# Patient Record
Sex: FEMALE | Race: WHITE | Employment: OTHER | URBAN - METROPOLITAN AREA
[De-identification: names, ages, dates, MRNs, and addresses within clinical notes are randomized per-mention and may not be internally consistent; named-entity substitution may affect disease eponyms.]

---

## 2017-03-10 ENCOUNTER — TRANSFERRED RECORDS (OUTPATIENT)
Dept: HEALTH INFORMATION MANAGEMENT | Facility: CLINIC | Age: 62
End: 2017-03-10

## 2017-09-26 ENCOUNTER — TRANSFERRED RECORDS (OUTPATIENT)
Dept: HEALTH INFORMATION MANAGEMENT | Facility: CLINIC | Age: 62
End: 2017-09-26

## 2017-11-03 ENCOUNTER — TRANSFERRED RECORDS (OUTPATIENT)
Dept: HEALTH INFORMATION MANAGEMENT | Facility: CLINIC | Age: 62
End: 2017-11-03

## 2018-01-23 ENCOUNTER — TRANSFERRED RECORDS (OUTPATIENT)
Dept: HEALTH INFORMATION MANAGEMENT | Facility: CLINIC | Age: 63
End: 2018-01-23

## 2018-01-23 LAB
CHOLEST SERPL-MCNC: 298 MG/DL (ref 100–199)
CREAT SERPL-MCNC: 0.8 MG/DL (ref 0.57–1.11)
GFR SERPL CREATININE-BSD FRML MDRD: >60 ML/MIN/1.73M2
GLUCOSE SERPL-MCNC: 109 MG/DL (ref 65–100)
HDLC SERPL-MCNC: 71 MG/DL
LDLC SERPL CALC-MCNC: 204 MG/DL
NONHDLC SERPL-MCNC: 227 MG/DL
PAP-ABSTRACT: NORMAL
POTASSIUM SERPL-SCNC: 4.2 MMOL/L (ref 3.5–5)
TRIGL SERPL-MCNC: 114 MG/DL

## 2018-06-15 ENCOUNTER — TRANSFERRED RECORDS (OUTPATIENT)
Dept: HEALTH INFORMATION MANAGEMENT | Facility: CLINIC | Age: 63
End: 2018-06-15

## 2019-02-04 ENCOUNTER — OFFICE VISIT (OUTPATIENT)
Dept: FAMILY MEDICINE | Facility: CLINIC | Age: 64
End: 2019-02-04
Payer: COMMERCIAL

## 2019-02-04 VITALS
HEART RATE: 56 BPM | BODY MASS INDEX: 28.7 KG/M2 | SYSTOLIC BLOOD PRESSURE: 114 MMHG | TEMPERATURE: 98.2 F | OXYGEN SATURATION: 97 % | WEIGHT: 162 LBS | HEIGHT: 63 IN | DIASTOLIC BLOOD PRESSURE: 60 MMHG

## 2019-02-04 DIAGNOSIS — N39.0 RECURRENT UTI: ICD-10-CM

## 2019-02-04 DIAGNOSIS — R23.2 HOT FLASHES: ICD-10-CM

## 2019-02-04 DIAGNOSIS — Z79.890 POSTMENOPAUSAL HORMONE REPLACEMENT THERAPY: ICD-10-CM

## 2019-02-04 DIAGNOSIS — R25.1 TREMOR: Primary | ICD-10-CM

## 2019-02-04 PROCEDURE — 99203 OFFICE O/P NEW LOW 30 MIN: CPT | Performed by: FAMILY MEDICINE

## 2019-02-04 RX ORDER — PROPRANOLOL HYDROCHLORIDE 40 MG/1
40 TABLET ORAL 2 TIMES DAILY
Qty: 90 TABLET | Refills: 1 | Status: SHIPPED | OUTPATIENT
Start: 2019-02-04 | End: 2019-05-01

## 2019-02-04 RX ORDER — CIPROFLOXACIN 250 MG/1
TABLET, FILM COATED ORAL
Refills: 0 | COMMUNITY
Start: 2018-12-04 | End: 2019-02-04

## 2019-02-04 RX ORDER — CIPROFLOXACIN 250 MG/1
TABLET, FILM COATED ORAL
Qty: 20 TABLET | Refills: 0 | Status: SHIPPED | OUTPATIENT
Start: 2019-02-04 | End: 2019-11-02

## 2019-02-04 RX ORDER — CELECOXIB 200 MG/1
200 CAPSULE ORAL
COMMUNITY
Start: 2018-10-25 | End: 2019-04-30 | Stop reason: DRUGHIGH

## 2019-02-04 RX ORDER — PROPRANOLOL HYDROCHLORIDE 40 MG/1
40 TABLET ORAL
COMMUNITY
Start: 2019-02-01 | End: 2019-02-04

## 2019-02-04 SDOH — HEALTH STABILITY: MENTAL HEALTH: HOW OFTEN DO YOU HAVE A DRINK CONTAINING ALCOHOL?: NEVER

## 2019-02-04 ASSESSMENT — MIFFLIN-ST. JEOR: SCORE: 1258.96

## 2019-02-04 NOTE — PROGRESS NOTES
SUBJECTIVE:   Tip Deng is a 63 year old female who presents to clinic today for the following health issues:      New Patient/Transfer of Care.  She now has new insurance, so here to establish care  Medication Followup/Refill request Propranolol, Cipro, Celebrex and Estradiol cream     Taking Medication as prescribed: yes    Side Effects:  None    Medication Helping Symptoms:  yes       PROBLEMS TO ADD ON...  1. She has known history of tremor and has been on Inderal for a few years . doing quite well and wish to continue.    2. She has history of recurrent UTI's especially after intercourse, so she has been given Cipro to take as needed right after the intercourse and that works well for her.  She is sexually active although her partner is out of town, so not needing it very frequently.  No symptoms at this time.  She likes to have prescription handy just in case when she needs it.    3. She also has been on HRT for last 3-years.  Although she has been menopausal for many years and always has suffered with hot flashes and it affects her sleep, so she finally decided to take HRT and it really has helped.  She wish to continue.  Although she had her physical just about a year ago and she will be due soon and plans to schedule and return for yearly checkup.  Denies any other new concerning symptoms  today otherwise.     Problem list and histories reviewed & adjusted, as indicated.  Additional history: as documented    There is no problem list on file for this patient.    Past Surgical History:   Procedure Laterality Date      SECTION            HIP SURGERY      2016 , both hip replaced        Social History     Tobacco Use     Smoking status: Former Smoker     Smokeless tobacco: Never Used   Substance Use Topics     Alcohol use: No     Frequency: Never     Family History   Problem Relation Age of Onset     Coronary Artery Disease Mother         mom had bypass at age 80+      Coronary Artery  "Disease Father         on meds     Hyperlipidemia Father      Coronary Artery Disease Other         PGF, had bypass at age 70      Diabetes No family hx of      Cerebrovascular Disease No family hx of      Colon Cancer No family hx of      Breast Cancer No family hx of            Reviewed and updated as needed this visit by clinical staff       Reviewed and updated as needed this visit by Provider         ROS:  Constitutional, HEENT, cardiovascular, pulmonary, GI, , musculoskeletal, neuro, skin, endocrine and psych systems are negative, except as otherwise noted.    OBJECTIVE:     /60   Pulse 56   Temp 98.2  F (36.8  C) (Tympanic)   Ht 1.6 m (5' 3\")   Wt 73.5 kg (162 lb)   SpO2 97%   BMI 28.70 kg/m    Body mass index is 28.7 kg/m .  GENERAL: healthy, alert and no distress  NECK: no adenopathy  RESP: lungs clear to auscultation - no rales, rhonchi or wheezes  CV: regular rate and rhythm, normal S1 S2, no S3 or S4, no murmur, click or rub, no peripheral edema and peripheral pulses strong  PSYCH: mentation appears normal, affect normal/bright        ASSESSMENT/PLAN:         (R25.1) Tremor  (primary encounter diagnosis)  Comment:   Plan: propranolol (INDERAL) 40 MG tablet            (N39.0) Recurrent UTI  Comment: Related to intercourse mostly.  Taking Cipro prophylactically  Plan: ciprofloxacin (CIPRO) 250 MG tablet      (Z79.890) Postmenopausal hormone replacement therapy  Comment:   Plan: ESTRADIOL 0.025/ESTRIOL 0.1 MG/GM CREAM,         progesterone (PROMETRIUM) 200 MG capsule            (R23.2) Hot flashes  Comment:   Plan: ESTRADIOL 0.025/ESTRIOL 0.1 MG/GM CREAM,         progesterone (PROMETRIUM) 200 MG capsule        Has has been on HRT for 3 years, pros/ cons of med's discussed. she wish to continue.  Plans to return for annual physical.           Patient expressed understanding and agreement with treatment plan. All patient's questions were answered, will let me know if has more " later.  Medications: Rx's: Reviewed the potential side effects/complications of medications prescribed.       Kaylene Mccartney MD  [facility] [place]

## 2019-02-04 NOTE — PATIENT INSTRUCTIONS
Take medications as directed.  Cares and treatment  cares discussed   Follow up if problem or concern

## 2019-02-25 ENCOUNTER — OFFICE VISIT (OUTPATIENT)
Dept: FAMILY MEDICINE | Facility: CLINIC | Age: 64
End: 2019-02-25
Payer: COMMERCIAL

## 2019-02-25 VITALS
RESPIRATION RATE: 16 BRPM | OXYGEN SATURATION: 98 % | SYSTOLIC BLOOD PRESSURE: 109 MMHG | TEMPERATURE: 97.5 F | HEART RATE: 54 BPM | DIASTOLIC BLOOD PRESSURE: 71 MMHG

## 2019-02-25 DIAGNOSIS — Z71.84 TRAVEL ADVICE ENCOUNTER: Primary | ICD-10-CM

## 2019-02-25 PROCEDURE — 90691 TYPHOID VACCINE IM: CPT | Performed by: NURSE PRACTITIONER

## 2019-02-25 PROCEDURE — 99402 PREV MED CNSL INDIV APPRX 30: CPT | Mod: 25 | Performed by: NURSE PRACTITIONER

## 2019-02-25 PROCEDURE — 90471 IMMUNIZATION ADMIN: CPT | Performed by: NURSE PRACTITIONER

## 2019-02-25 RX ORDER — ATOVAQUONE AND PROGUANIL HYDROCHLORIDE 250; 100 MG/1; MG/1
1 TABLET, FILM COATED ORAL DAILY
Qty: 20 TABLET | Refills: 0 | Status: SHIPPED | OUTPATIENT
Start: 2019-02-25 | End: 2019-04-17

## 2019-02-25 RX ORDER — AZITHROMYCIN 500 MG/1
500 TABLET, FILM COATED ORAL DAILY
Qty: 3 TABLET | Refills: 0 | Status: SHIPPED | OUTPATIENT
Start: 2019-02-25 | End: 2019-04-17

## 2019-02-25 NOTE — NURSING NOTE
"Chief Complaint   Patient presents with     Travel Clinic     [place]      /71   Pulse 54   Temp 97.5  F (36.4  C) (Oral)   Resp 16   SpO2 98%  Estimated body mass index is 28.7 kg/m  as calculated from the following:    Height as of 2/4/19: 1.6 m (5' 3\").    Weight as of 2/4/19: 73.5 kg (162 lb).  bp completed using cuff size: regular       Health Maintenance addressed:  NONE    n/a    Adela Silva MA     "

## 2019-02-25 NOTE — PROGRESS NOTES
Nurse Note      Itinerary:  [place], [place]       Departure Date: 03/7/2019      Return Date: 03/22/2019      Length of Trip 15 days       Reason for Travel: Tourism           Urban or rural: both      Accommodations: Hotel        IMMUNIZATION HISTORY  Have you received any immunizations within the past 4 weeks?  No  Have you ever fainted from having your blood drawn or from an injection?  No  Have you ever had a fever reaction to vaccination?  No  Have you ever had any bad reaction or side effect from any vaccination?  No  Have you ever had hepatitis A or B vaccine?  Yes  Do you live (or work closely) with anyone who has AIDS, an AIDS-like condition, any other immune disorder or who is on chemotherapy for cancer?  No  Do you have a family history of immunodeficiency?  No  Have you received any injection of immune globulin or any blood products during the past 12 months?  No    Patient roomed by CAROLE Jenkins  Tip Deng is a 63 year old female seen today alone for counsultation for international travel to [facility] for [employer]ism  [employer].  Patient will be departing 10 days and staying for   2 week(s) and  traveling with co-worker(s).      Patient itinerary :  will be in the urban and [place]  region of [facility] which presents risk for Malaria, Dengue Fever, [place] and Zika. exposure.  Itinerary is not known at this time.     Patient's activities will include sightseeing./ business    Patient's country of birth is [place]    Special medical concerns: recurrent UTI  Pre-travel questionnaire was completed by patient and reviewed by provider.     Vitals: /71   Pulse 54   Temp 97.5  F (36.4  C) (Oral)   Resp 16   SpO2 98%   BMI= There is no height or weight on file to calculate BMI.    EXAM:  General:  Well-nourished, well-developed in no acute distress.  Appears to be stated age, interacts appropriately and expresses understanding of information given to patient.    Current  Outpatient Medications   Medication Sig Dispense Refill     celecoxib (CELEBREX) 200 MG capsule Take 200 mg by mouth       ciprofloxacin (CIPRO) 250 MG tablet TAKE 1 T PO AFTER INTERCOURSE AS DIRECTED 20 tablet 0     ESTRADIOL 0.025/ESTRIOL 0.1 MG/GM CREAM Apply 1/4  teaspoonful 1-2 times daily.       progesterone (PROMETRIUM) 200 MG capsule Take 1 capsule (200 mg) by mouth daily 10 capsule 0     propranolol (INDERAL) 40 MG tablet Take 1 tablet (40 mg) by mouth 2 times daily 90 tablet 1     Patient Active Problem List   Diagnosis     Tremor     Recurrent UTI     Postmenopausal hormone replacement therapy     Hot flashes     No Known Allergies      Immunizations discussed include:   Hepatitis A:  Up to date  Hepatitis B: Up to date  Influenza: Up to date  Typhoid: Ordered/given today, risks, benefits and side effects reviewed  Rabies: Declined  Not concerned about risk of disease  reviewed managment of a animal bite or scratch (washing wound, seek medical care within 24 hours for post exposure prophylaxis )  Yellow Fever: Not indicated  Japanese Encephalitis: Not indicated  Meningococcus: Not indicated  Tetanus/Diphtheria: Up to date  Measles/Mumps/Rubella: Immune by disease history per patient report  Cholera: Not needed  Polio: Up to date  Pneumococcal: Under age of 65  Varicella: Immune by disease history per patient report  Zostavax:  Not indicated  Shingrix: deferred  HPV:  Not indicated  TB:  Low risk     Altitude Exposure on this trip: no  Past tolerance to Altitude: na    ASSESSMENT/PLAN:    ICD-10-CM    1. Travel advice encounter Z71.89 atovaquone-proguanil (MALARONE) 250-100 MG tablet     azithromycin (ZITHROMAX) 500 MG tablet     VACCINE ADMINISTRATION, INITIAL     I have reviewed general recommendations for safe travel   including: food/water precautions, insect precautions, safer sex   practices given high prevalence of Zika, HIV and other STDs,   roadway safety. Educational materials and [employer] report  provided.    Malaraia prophylaxis recommended: Malarone (patient will check itinerary for exposure risk )  Symptomatic treatment for traveler's diarrhea: azithromycin  Altitude illness prevention and treatment: none      Evacuation insurance advised and resources were provided to patient.    Total visit time 30 minutes  with over 50% of time spent counseling patient as detailed above.    Mari Amaya CNP

## 2019-02-25 NOTE — PATIENT INSTRUCTIONS
Today February 25, 2019 you received the    Typhoid - injectable. This vaccine is valid for two years.   .    These appointments can be made as a NURSE ONLY visit.    **It is very important for the vaccinations to be given on the scheduled day(s), this helps ensure you receive the full effectiveness of the vaccine.**    Please call [facility] with any questions [phone]    Thank you for visiting [facility]

## 2019-02-25 NOTE — NURSING NOTE
Screening Questionnaire for Adult Immunization    Are you sick today?   No   Do you have allergies to medications, food, a vaccine component or latex?   No   Have you ever had a serious reaction after receiving a vaccination?   No   Do you have a long-term health problem with heart disease, lung disease, asthma, kidney disease, metabolic disease (e.g. diabetes), anemia, or other blood disorder?   No   Do you have cancer, leukemia, HIV/AIDS, or any other immune system problem?   No   In the past 3 months, have you taken medications that affect  your immune system, such as prednisone, other steroids, or anticancer drugs; drugs for the treatment of rheumatoid arthritis, Crohn s disease, or psoriasis; or have you had radiation treatments?   No   Have you had a seizure, or a brain or other nervous system problem?   No   During the past year, have you received a transfusion of blood or blood     products, or been given immune (gamma) globulin or antiviral drug?   No   For women: Are you pregnant or is there a chance you could become        pregnant during the next month?   No   Have you received any vaccinations in the past 4 weeks?   No     Immunization questionnaire answers were all negative.        Per orders of GATO Amaya, injection of Typhoid given by Adela Silva. Patient instructed to remain in clinic for 15 minutes afterwards, and to report any adverse reaction to me immediately.       Screening performed by Adela Silva on 2/25/2019 at 11:23 AM.

## 2019-04-17 ENCOUNTER — ANCILLARY PROCEDURE (OUTPATIENT)
Dept: MAMMOGRAPHY | Facility: CLINIC | Age: 64
End: 2019-04-17
Attending: FAMILY MEDICINE
Payer: COMMERCIAL

## 2019-04-17 ENCOUNTER — OFFICE VISIT (OUTPATIENT)
Dept: FAMILY MEDICINE | Facility: CLINIC | Age: 64
End: 2019-04-17
Payer: COMMERCIAL

## 2019-04-17 VITALS
HEART RATE: 56 BPM | TEMPERATURE: 97.9 F | OXYGEN SATURATION: 98 % | HEIGHT: 63 IN | DIASTOLIC BLOOD PRESSURE: 60 MMHG | WEIGHT: 159 LBS | BODY MASS INDEX: 28.17 KG/M2 | SYSTOLIC BLOOD PRESSURE: 110 MMHG

## 2019-04-17 DIAGNOSIS — Z12.31 VISIT FOR SCREENING MAMMOGRAM: ICD-10-CM

## 2019-04-17 DIAGNOSIS — Z13.9 SCREENING FOR CONDITION: ICD-10-CM

## 2019-04-17 DIAGNOSIS — Z00.00 ROUTINE HISTORY AND PHYSICAL EXAMINATION OF ADULT: Primary | ICD-10-CM

## 2019-04-17 DIAGNOSIS — R23.2 HOT FLASHES: ICD-10-CM

## 2019-04-17 DIAGNOSIS — Z79.890 POSTMENOPAUSAL HORMONE REPLACEMENT THERAPY: ICD-10-CM

## 2019-04-17 DIAGNOSIS — E78.5 HYPERLIPIDEMIA LDL GOAL <130: ICD-10-CM

## 2019-04-17 LAB
ALBUMIN SERPL-MCNC: 3.8 G/DL (ref 3.4–5)
ALP SERPL-CCNC: 53 U/L (ref 40–150)
ALT SERPL W P-5'-P-CCNC: 23 U/L (ref 0–50)
ANION GAP SERPL CALCULATED.3IONS-SCNC: 5 MMOL/L (ref 3–14)
AST SERPL W P-5'-P-CCNC: 20 U/L (ref 0–45)
BILIRUB SERPL-MCNC: 1.4 MG/DL (ref 0.2–1.3)
BUN SERPL-MCNC: 16 MG/DL (ref 7–30)
CALCIUM SERPL-MCNC: 9.3 MG/DL (ref 8.5–10.1)
CHLORIDE SERPL-SCNC: 106 MMOL/L (ref 94–109)
CHOLEST SERPL-MCNC: 323 MG/DL
CO2 SERPL-SCNC: 29 MMOL/L (ref 20–32)
CREAT SERPL-MCNC: 0.76 MG/DL (ref 0.52–1.04)
GFR SERPL CREATININE-BSD FRML MDRD: 83 ML/MIN/{1.73_M2}
GLUCOSE SERPL-MCNC: 112 MG/DL (ref 70–99)
HDLC SERPL-MCNC: 72 MG/DL
LDLC SERPL CALC-MCNC: 219 MG/DL
NONHDLC SERPL-MCNC: 251 MG/DL
POTASSIUM SERPL-SCNC: 4.2 MMOL/L (ref 3.4–5.3)
PROT SERPL-MCNC: 7.2 G/DL (ref 6.8–8.8)
SODIUM SERPL-SCNC: 140 MMOL/L (ref 133–144)
TRIGL SERPL-MCNC: 162 MG/DL

## 2019-04-17 PROCEDURE — 86803 HEPATITIS C AB TEST: CPT | Performed by: FAMILY MEDICINE

## 2019-04-17 PROCEDURE — 99396 PREV VISIT EST AGE 40-64: CPT | Performed by: FAMILY MEDICINE

## 2019-04-17 PROCEDURE — 80053 COMPREHEN METABOLIC PANEL: CPT | Performed by: FAMILY MEDICINE

## 2019-04-17 PROCEDURE — 80061 LIPID PANEL: CPT | Performed by: FAMILY MEDICINE

## 2019-04-17 PROCEDURE — 77067 SCR MAMMO BI INCL CAD: CPT | Mod: TC

## 2019-04-17 PROCEDURE — 36415 COLL VENOUS BLD VENIPUNCTURE: CPT | Performed by: FAMILY MEDICINE

## 2019-04-17 PROCEDURE — 87389 HIV-1 AG W/HIV-1&-2 AB AG IA: CPT | Performed by: FAMILY MEDICINE

## 2019-04-17 ASSESSMENT — MIFFLIN-ST. JEOR: SCORE: 1245.35

## 2019-04-17 NOTE — PROGRESS NOTES
SUBJECTIVE:   CC: Tip Deng is an 63 year old woman who presents for preventive health visit.     Healthy Habits:    Do you get at least three servings of calcium containing foods daily (dairy, green leafy vegetables, etc.)? yes    Amount of exercise or daily activities, outside of work: 6 day(s) per week    Problems taking medications regularly No    Medication side effects: No    Have you had an eye exam in the past two years? yes    Do you see a dentist twice per year? no    Do you have sleep apnea, excessive snoring or daytime drowsiness?no      PROBLEMS TO ADD ON...    Today's PHQ-2 Score:   PHQ-2 (  [employer]) 2019   Q1: Little interest or pleasure in doing things 0   Q2: Feeling down, depressed or hopeless 0   PHQ-2 Score 0       Abuse: Current or Past(Physical, Sexual or Emotional)- No  Do you feel safe in your environment? Yes    Social History     Tobacco Use     Smoking status: Former Smoker     Smokeless tobacco: Never Used   Substance Use Topics     Alcohol use: No     Frequency: Never     If you drink alcohol do you typically have >3 drinks per day or >7 drinks per week? No                     Reviewed orders with patient.  Reviewed health maintenance and updated orders accordingly - Yes  Patient Active Problem List   Diagnosis     Tremor     Recurrent UTI     Postmenopausal hormone replacement therapy     Hot flashes     Hyperlipidemia LDL goal <160     Past Surgical History:   Procedure Laterality Date      SECTION            HIP SURGERY      2016 , both hip replaced        Social History     Tobacco Use     Smoking status: Former Smoker     Smokeless tobacco: Never Used   Substance Use Topics     Alcohol use: No     Frequency: Never     Family History   Problem Relation Age of Onset     Coronary Artery Disease Mother         mom had bypass at age 80+      Coronary Artery Disease Father         on meds     Hyperlipidemia Father      Coronary Artery Disease Other          PGF, had bypass at age 70      Diabetes No family hx of      Cerebrovascular Disease No family hx of      Colon Cancer No family hx of      Breast Cancer No family hx of            Mammogram Screening: Patient over age 50, mutual decision to screen reflected in health maintenance.    Pertinent mammograms are reviewed under the imaging tab.  History of abnormal Pap smear: NO - age 30- 65 PAP every 3 years recommended     Reviewed and updated as needed this visit by clinical staff         Reviewed and updated as needed this visit by Provider        Past Medical History:   Diagnosis Date     Arthritis     knees and hips      Tremor         ROS:  CONSTITUTIONAL: NEGATIVE for fever, chills, change in weight  INTEGUMENTARU/SKIN: NEGATIVE for worrisome rashes, moles or lesions  EYES: NEGATIVE for vision changes or irritation  ENT: NEGATIVE for ear, mouth and throat problems  RESP: NEGATIVE for significant cough or SOB  BREAST: NEGATIVE for masses, tenderness or discharge  CV: NEGATIVE for chest pain, palpitations or peripheral edema  GI: NEGATIVE for nausea, abdominal pain, heartburn, or change in bowel habits  : NEGATIVE for unusual urinary or vaginal symptoms. Periods are regular.  MUSCULOSKELETAL: NEGATIVE for significant arthralgias or myalgia  NEURO: NEGATIVE for weakness, dizziness or paresthesias  ENDOCRINE: NEGATIVE for temperature intolerance, skin/hair changes  HEME/ALLERGY/IMMUNE: NEGATIVE for bleeding problems  PSYCHIATRIC: NEGATIVE for changes in mood or affect    OBJECTIVE:   There were no vitals taken for this visit.  EXAM:  GENERAL APPEARANCE: healthy, alert and no distress  EYES: Eyes grossly normal to inspection, PERRL and conjunctivae and sclerae normal  HENT: ear canals and TM's normal, nose and mouth without ulcers or lesions, oropharynx clear and oral mucous membranes moist  NECK: no adenopathy, no asymmetry, masses, or scars and thyroid normal to palpation  RESP: lungs clear to auscultation - no  rales, rhonchi or wheezes  BREAST: normal without masses, tenderness or nipple discharge and no palpable axillary masses or adenopathy  CV: regular rate and rhythm, normal S1 S2, no S3 or S4, no murmur, click or rub, no peripheral edema and peripheral pulses strong  ABDOMEN: soft, nontender, no hepatosplenomegaly, no masses and bowel sounds normal   (female): deferred  MS: no musculoskeletal defects are noted and gait is age appropriate without ataxia  SKIN: no suspicious lesions or rashes  NEURO: Normal strength and tone, sensory exam grossly normal, mentation intact and speech normal  PSYCH: mentation appears normal and affect normal/bright        ASSESSMENT/PLAN:   (Z00.00) Routine history and physical examination of adult  (primary encounter diagnosis)  Comment:   Plan: Lipid panel reflex to direct LDL Fasting,         Comprehensive metabolic panel            (Z79.890) Postmenopausal hormone replacement therapy  Comment:   Plan:     (R23.2) Hot flashes  Comment: prescribed by previous provider,   Plan: ding well on hrt.  she is trying to go down     (E78.5) Hyperlipidemia LDL goal <130  Comment: has been quiet elevated previously, may consider treatment if remains high   Plan: Lipid panel reflex to direct LDL Fasting,         Comprehensive metabolic panel        Healthy diet and exercise reviewed.    (Z13.9) Screening for condition  Comment:   Plan: HIV Antigen Antibody Combo, Hepatitis C Screen         Reflex to HCV RNA Quant and Genotype            Check labs..Cares and  treatment discussed follow. up if problem   Patient expressed understanding and agreement with treatment plan. All patient's questions were answered, will let me know if has more later.  Medications: Rx's: Reviewed the potential side effects/complications of medications prescribed.     COUNSELING:   Reviewed preventive health counseling, as reflected in patient instructions       Regular exercise       Healthy diet/nutrition       Vision  "screening       Hearing screening       Immunizations    Vaccinated for: Zoster             Osteoporosis Prevention/Bone Health       Consider Hep C screening for patients born between 1945 and 1965       HIV screeninx in teen years, 1x in adult years, and at intervals if high risk    BP Readings from Last 1 Encounters:   19 109/71     Estimated body mass index is 28.7 kg/m  as calculated from the following:    Height as of 19: 1.6 m (5' 3\").    Weight as of 19: 73.5 kg (162 lb).      Weight management plan: Discussed healthy diet and exercise guidelines     reports that she has quit smoking. She has never used smokeless tobacco.      Counseling Resources:  ATP IV Guidelines  Pooled Cohorts Equation Calculator  Breast Cancer Risk Calculator  FRAX Risk Assessment  ICSI Preventive Guidelines  Dietary Guidelines for Americans, 2010  USDA's MyPlate  ASA Prophylaxis  Lung CA Screening    Kaylene Mccartney MD  [facility] [place]  "

## 2019-04-18 LAB
HCV AB SERPL QL IA: NONREACTIVE
HIV 1+2 AB+HIV1 P24 AG SERPL QL IA: NONREACTIVE

## 2019-04-30 ENCOUNTER — OFFICE VISIT (OUTPATIENT)
Dept: FAMILY MEDICINE | Facility: CLINIC | Age: 64
End: 2019-04-30
Payer: COMMERCIAL

## 2019-04-30 VITALS
SYSTOLIC BLOOD PRESSURE: 98 MMHG | HEART RATE: 54 BPM | OXYGEN SATURATION: 99 % | DIASTOLIC BLOOD PRESSURE: 54 MMHG | TEMPERATURE: 98.1 F | BODY MASS INDEX: 28.34 KG/M2 | WEIGHT: 160 LBS

## 2019-04-30 DIAGNOSIS — M25.561 PAIN IN BOTH KNEES, UNSPECIFIED CHRONICITY: ICD-10-CM

## 2019-04-30 DIAGNOSIS — M25.562 PAIN IN BOTH KNEES, UNSPECIFIED CHRONICITY: ICD-10-CM

## 2019-04-30 DIAGNOSIS — E78.5 HYPERLIPIDEMIA LDL GOAL <160: Primary | ICD-10-CM

## 2019-04-30 PROCEDURE — 99213 OFFICE O/P EST LOW 20 MIN: CPT | Performed by: FAMILY MEDICINE

## 2019-04-30 RX ORDER — CELECOXIB 200 MG/1
200 CAPSULE ORAL
Status: CANCELLED | OUTPATIENT
Start: 2019-04-30

## 2019-04-30 RX ORDER — PROPRANOLOL HYDROCHLORIDE 40 MG/1
40 TABLET ORAL 2 TIMES DAILY
Qty: 90 TABLET | Refills: 1 | Status: CANCELLED | OUTPATIENT
Start: 2019-04-30

## 2019-04-30 RX ORDER — SIMVASTATIN 10 MG
10 TABLET ORAL AT BEDTIME
Qty: 30 TABLET | Refills: 3 | Status: SHIPPED | OUTPATIENT
Start: 2019-04-30 | End: 2019-09-04

## 2019-04-30 RX ORDER — CELECOXIB 100 MG/1
100-200 CAPSULE ORAL DAILY PRN
Qty: 60 CAPSULE | Refills: 1 | Status: SHIPPED | OUTPATIENT
Start: 2019-04-30 | End: 2019-07-12

## 2019-04-30 NOTE — PATIENT INSTRUCTIONS
Patient Education     High Cholesterol: Assessing Your Risk    Have you been told that your cholesterol is too high? If so, you could be heading for a heart attack, also known as acute myocardial infarction (AMI), or stroke. This is especially true if you have other risk factors for heart disease. Get smart about cholesterol and your heart disease risk. This sheet can help you understand your heart disease risk and how your cholesterol level affects it. Talk to your healthcare provider about how to get started controlling your cholesterol.  Why is high cholesterol a problem?  Blood cholesterol is a fatty substance. The body uses it to make membranes in cells and for hormone production. It travels through the bloodstream and is used by the tissues for normal function. When blood cholesterol is high, it forms plaque and causes inflammation. The plaque builds up in the walls of arteries (blood vessels that carry blood from the heart to the body). This narrows the opening for blood flow. Over time, the heart may not get enough oxygen. This can lead to coronary artery disease, heart attack, or stroke.  3 steps to assessing your risk  Step 1. Find your risk factors for heart disease and stroke  How your cholesterol numbers affect your heart health depends on other risk factors for heart attack and stroke. Check off each risk factor below that applies to you:    Are you a man 45 years old or older or a woman 55 years old or older?    Does your family have a history of heart problems before the age of 55 in male relatives or age 65 in female relatives? This includes heart attack, coronary heart disease, or atherosclerosis.    Do you have high blood pressure? Do you take medicine to treat high blood pressure?    Do you smoke?    Do you have diabetes?    Do you exercise very little or not very often? Recommendations are for 30 minutes of exercise at least 5 days a week. If you are not doing cardiovascular exercise as often  as these recommendations, it may not be enough and you may be at higher risk for elevated cholesterol and heart disease.    Do you eat a diet that is high in saturated or trans fats, cholesterol, sugar, or alcohol? You may be at increased risk for heart disease if you do not eat enough fruits, vegetables, lean meats and eat sugars or drink alcohol sparingly.    Have you been told you have high cholesterol? Do you take medicine to control your cholesterol?  Step 2. Test your cholesterol  Have your cholesterol tested every 5 years after the age of 20 and more often if you have risk factors. Cholesterol testing most often needs no preparation. Sometimes you may be asked to fast (not eat) before your test. A blood sample is taken and sent to a lab. There, the amount of cholesterol and triglyceride in your blood is measured. There are 2 types of cholesterol in the sample. The first is HDL ( good cholesterol ). The second is LDL ( bad cholesterol ). Cholesterol test results are most often shown as the total of HDL and LDL cholesterol numbers. You may also be told the separate HDL and LDL cholesterol results.  Fill in your numbers below.  HDL cholesterol:                           LDL cholesterol:                         Total cholesterol:                         Triglyceride:                           Step 3. Discuss the results with your healthcare provider   If your cholesterol levels are higher than normal, your healthcare provider will help you with steps to take to lower your levels. Steps may include lifestyle changes like diet, physical activity, and quitting smoking, and medicine to lower bad cholesterol levels.  If you have high cholesterol, you may need your cholesterol level tested more often to make sure your medicine and lifestyle changes are working to reduce your risks of having a heart attack or stroke.   Date Last Reviewed: 6/1/2016 2000-2018 [employer] \[facility]\""  [place] [address]. All rights reserved. This information is not intended as a substitute for professional medical care. Always follow your healthcare professional's instructions.           Patient Education     Understanding Fat and Cholesterol  Too much cholesterol in your blood can lead to many problems such as blocked arteries. This can lead to heart attack and stroke. One of the best ways to manage heart and blood vessel disease is to lower your blood cholesterol. Planning meals that are low in saturated fat and cholesterol helps reduce the level of cholesterol in your blood. Below are eating tips to help lower your blood cholesterol levels.  Eat less fat  A healthy goal is to have less than 25% to 35% of your daily calories come from fat. Instead of fats, eat more fruits, whole-grains, and vegetables. This also helps control your weight, and can even reduce your risk for some cancers. There are different kinds of fats in foods. Fats can be saturated, unsaturated, or trans fats. The best fats to choose are unsaturated fats. But fats are high in calories, so eat even unsaturated fats sparingly.  Limit foods high in saturated fats  Saturated fats come from animals and certain plants (such as coconut and palm). Eating too much saturated fat can raise your blood cholesterol levels and make your artery problems worse. Your goal is to eat less saturated fat. Below are some examples of foods that contain lots of saturated fat:    Fatty cuts of meat (lamb, ham, beef)    Many pastries, cakes, cookies, and candies    Cream, ice cream, sour cream, cheese, and butter, and foods made with them    Sauces made with butter or cream    Salad dressings with saturated fats    Foods that contain palm or coconut oil  Choose unsaturated fats  Unsaturated fats are usually liquid at room temperature. They are better choices for your heart than saturated fat. There are two types of unsaturated fats: polyunsaturated fat and monounsaturated fat.  Aim to replace saturated fats with polyunsaturated or monounsaturated fats.    Polyunsaturated fats are found in corn oil, safflower oil, sunflower oil, and other vegetable oils.    Monounsaturated fats are found in olive oil, canola oil, and peanut oil. Some margarines and spreads are now made with these oils, too. Avocados are also high in monounsaturated fat.  Of all fats, monounsaturated fats are the least harmful to your heart.  Avoid trans fats  Like saturated fats, trans fats have been linked to heart disease. Even a small amount can harm your health. Trans fats are found in liquid oils that have been changed to be solid at room temperature. Margarine, which is often made from vegetable oil, is one example. Vegetable shortening is another. Trans fats are often found in packaged goods. Check ingredients for the words  hydrogenated  or  partially hydrogenated.  They mean the foods contain trans fat.  What about triglycerides?  Triglycerides are a type of fat in your blood. Like cholesterol, high levels of triglycerides can lead to blocked arteries. High triglyceride levels can be reduced 20% to 50%  by limiting added sugars in your diet, susbstituting healthier fats for saturated and trans fats, getting more physical activity, and losing weight if your are overweight. You may also be advised to avoid or limi alcohol.    Reading food labels  Luckily, most foods now have labels giving you the facts about what you re eating. Reading food labels helps you make healthy choices. Look for the words highlighted below.    Serving Size. This is the amount of food in 1 serving. If you eat larger portions, be sure to count more of everything: fat, calories, and cholesterol.    Total Fat. Tells you how many grams (g) of fat are in 1 serving.    Calories from Fat. This tells you the total number of calories from fat in 1 serving (there are 9 calories per gram of fat). Look for foods with the fewest calories from  fat.    Saturated Fat. Tells you how many grams (g) of saturated fat are in 1 serving.    Trans Fat. Tells how many grams (g) of trans fat are in 1 serving.    Cholesterol. Tells you how many milligrams (mg) of cholesterol are in 1 serving.  Date Last Reviewed: 6/1/2017 2000-2018 [employer]. [address] [place] [address]. All rights reserved. This information is not intended as a substitute for professional medical care. Always follow your healthcare professional's instructions.           Patient Education     Understanding Food and Cholesterol  Having a high cholesterol level puts you at risk for heart disease and other health problems. What you eat has a big effect on your body s cholesterol level. Eating certain foods can raise your cholesterol. Other foods can help you lower it. Watching what you eat can help you get your cholesterol level under control.  Know which foods are high in saturated fat and trans fat  Foods high in saturated fat and trans fat can raise your LDL (bad) cholesterol. It s important to know which foods are high in these fats, and eat less of them. This can help you manage your cholesterol levels.  Foods high in these fats are:    Animal products, including beef, lamb, pork, and poultry with skin on    Cold cuts, arcos, sausage    Creamy sauces and fatty gravies    Cookies, donuts, muffins, and pastries    Fried foods    Shortening, butter, coconut oil, palm oil, cocoa butter, partially hydrogenated oils (read labels)    High-fat dairy products, such as whole milk, cheese, cream cheese, and ice cream  Better choices:    Lean beef, skinless white-meat poultry, fish    Tomato sauce, vegetable puree    Dried fruit, bagels, bread with jam    Baked, broiled, steamed, or roasted foods    Soft (tub) margarine, canola oil, and olive oil in moderate amounts    Low-fat or nonfat dairy products, such as 1% or fat-free milk, and reduced-fat cheese  Use fiber to help  control cholesterol  Foods high in fiber can help you keep your cholesterol down. Good sources of fiber are:    Oats    Barley    Whole grains    Beans    Vegetables    Cornmeal    Popcorn    Berries, apples, other fruits  Date Last Reviewed: 10/1/2017    3671-4532 [employer]. [address] [place] [address]. All rights reserved. This information is not intended as a substitute for professional medical care. Always follow your healthcare professional's instructions.

## 2019-04-30 NOTE — PROGRESS NOTES
SUBJECTIVE:   Tip Deng is a 64 year old female who presents to clinic today for the following   health issues:      Concern - Pt is here to f/u for labs drawn on 19      Hyperlipidemia Follow-Up      Rate your low fat/cholesterol diet?: good    Taking statin?  No, although willing to start since her lipid has been elevated for a while and current labs are also very high . Has FAM hx of heart problem in mom and grand parent in old age. Also has hyperlipidemia in family members including dad and treated with med's     Other lipid medications/supplements?:  none    PROBLEMS TO ADD ON...  Joint or Musculoskeletal Pain  Duration of complaint:    Description: has ongoing problem for a long time likely osteoarthritis , as she has hip replaced previously , and also  her knees have ongoing pain, takes Celebrex almost daily at times to help,   So need refill . Has been prescribed by previous provider and taking it for a while   Location: both knee   Character: Dull ache affects her activity level   Intensity: mild, moderate  Progression of Symptoms: same, but lately needing pain med's almost daily   Accompanying Signs & Symptoms: Other symptoms: no radiation of pain , numbness, swelling and redness  History: Previous similar pain: YES  , was seeing ortho in [place] previously but none for few years  Precipitating factors: Trauma or overuse: YES, was active in sports at younger age   Alleviating factors: Improved by: Celebrex   Therapies Tried and outcome: it helps but needs it almost daily       Additional history: as documented    Reviewed  and updated as needed this visit by clinical staff         Reviewed and updated as needed this visit by Provider         Patient Active Problem List   Diagnosis     Tremor     Recurrent UTI     Postmenopausal hormone replacement therapy     Hot flashes     Hyperlipidemia LDL goal <160     Past Surgical History:   Procedure Laterality Date      SECTION             HIP SURGERY      2016 , both hip replaced        Social History     Tobacco Use     Smoking status: Former Smoker     Smokeless tobacco: Never Used   Substance Use Topics     Alcohol use: No     Frequency: Never     Family History   Problem Relation Age of Onset     Coronary Artery Disease Mother         mom had bypass at age 80+      Coronary Artery Disease Father         on meds     Hyperlipidemia Father      Coronary Artery Disease Other         PGF, had bypass at age 70      Diabetes No family hx of      Cerebrovascular Disease No family hx of      Colon Cancer No family hx of      Breast Cancer No family hx of            ROS:  Constitutional, HEENT, cardiovascular, pulmonary, GI, , musculoskeletal, neuro, skin, endocrine and psych systems are negative, except as otherwise noted.    OBJECTIVE:     BP 98/54 (BP Location: Left arm, Patient Position: Chair, Cuff Size: Adult Regular)   Pulse 54   Temp 98.1  F (36.7  C) (Tympanic)   Wt 72.6 kg (160 lb)   SpO2 99%   BMI 28.34 kg/m    Body mass index is 28.34 kg/m .  GENERAL: healthy, alert and no distress  NECK: no adenopathy, no asymmetry, masses, or scars and thyroid normal to palpation  RESP: lungs clear to auscultation - no rales, rhonchi or wheezes  CV: regular rate and rhythm, normal S1 S2, no S3 or S4, no murmur, click or rub, no peripheral edema and peripheral pulses strong  ABDOMEN: soft, nontender, no hepatosplenomegaly, no masses and bowel sounds normal  MS:  Both knees with normal range of motion and no acute tenderness to palpation today         ASSESSMENT/PLAN:         (E78.5) Hyperlipidemia LDL goal <160  (primary encounter diagnosis)  Comment:   Plan: simvastatin (ZOCOR) 10 MG tablet        Lipid remains quiet elevated over last few years , and LDL (bad cholesterol ) quiet high  and remain above goal. Also ahs other risk factors including FAM hx of cad in ole age, she is postmenopausal etc.  so discussed options of   starting treatment and she  is willing to start statin. Pros/ cons of med's discussed. She will start low dose Zocor. Pros/ cons f med's discussed   In the meantime need to continue watch diet( low fat, low cholesterol, high fiber diet) exercise. May also take omega-3 fatty acid( fish oil or flex seed oil capsule OTC) to help improve lipid  Cares and  treatment discussed.  follow up check 10- 12 weeks , sooner  if problem       (M25.561,  M25.562) Pain in both knees, unspecified chronicity  Comment: has ongoing problem for a long time likely osteoarthritis , as she has hip replaced previously   Plan: ORTHOPEDICS ADULT REFERRAL, celecoxib         (CELEBREX) 100 MG capsule        Pros/ cons f med's discussed. Refill given although encouraged alternating with OTC tylenol as needed. Also gave referral to ortho to further evaluate bc of her ongoing sx.         Patient expressed understanding and agreement with treatment plan. All patient's questions were answered, will let me know if has more later.  Medications: Rx's: Reviewed the potential side effects/complications of medications prescribed.       Kaylene Mccartney MD  [facility]

## 2019-05-01 DIAGNOSIS — R25.1 TREMOR: ICD-10-CM

## 2019-05-02 RX ORDER — PROPRANOLOL HYDROCHLORIDE 40 MG/1
TABLET ORAL
Qty: 180 TABLET | Refills: 0 | Status: SHIPPED | OUTPATIENT
Start: 2019-05-02 | End: 2019-07-12

## 2019-05-02 NOTE — TELEPHONE ENCOUNTER
Prescription approved per [facility] Refill Protocol.  Quantity updated to reflect twice a day dosing.  Maria Antonia Vasques RN

## 2019-05-02 NOTE — TELEPHONE ENCOUNTER
"Requested Prescriptions   Pending Prescriptions Disp Refills     propranolol (INDERAL) 40 MG tablet [Pharmacy Med Name: PROPRANOLOL 40MG TABLETS] 180 tablet 0     Sig: TAKE 1 TABLET BY MOUTH TWICE DAILY       Beta-Blockers Protocol Passed - 5/1/2019  7:25 PM        Passed - Blood pressure under 140/90 in past 12 months     BP Readings from Last 3 Encounters:   04/30/19 98/54   04/17/19 110/60   02/25/19 109/71                 Passed - Patient is age 6 or older        Passed - Recent (12 mo) or future (30 days) visit within the authorizing provider's specialty     Patient had office visit in the last 12 months or has a visit in the next 30 days with authorizing provider or within the authorizing provider's specialty.  See \"Patient Info\" tab in inbasket, or \"Choose Columns\" in Meds & Orders section of the refill encounter.              Passed - Medication is active on med list      Last Written Prescription Date:  2/4/2019  Last Fill Quantity: 90,  # refills: 1   Last office visit: 4/30/2019 with prescribing provider:  4/30/2019   Future Office Visit:      "

## 2019-05-27 DIAGNOSIS — R25.1 TREMOR: ICD-10-CM

## 2019-05-28 RX ORDER — PROPRANOLOL HYDROCHLORIDE 40 MG/1
TABLET ORAL
Qty: 180 TABLET | Refills: 0 | OUTPATIENT
Start: 2019-05-28

## 2019-05-28 NOTE — TELEPHONE ENCOUNTER
"Requested Prescriptions   Pending Prescriptions Disp Refills     propranolol (INDERAL) 40 MG tablet [Pharmacy Med Name: PROPRANOLOL 40MG TABLETS] 180 tablet 0     Sig: TAKE 1 TABLET BY MOUTH TWICE DAILY       Beta-Blockers Protocol Passed - 5/27/2019  2:32 PM        Passed - Blood pressure under 140/90 in past 12 months     BP Readings from Last 3 Encounters:   04/30/19 98/54   04/17/19 110/60   02/25/19 109/71                 Passed - Patient is age 6 or older        Passed - Recent (12 mo) or future (30 days) visit within the authorizing provider's specialty     Patient had office visit in the last 12 months or has a visit in the next 30 days with authorizing provider or within the authorizing provider's specialty.  See \"Patient Info\" tab in inbasket, or \"Choose Columns\" in Meds & Orders section of the refill encounter.              Passed - Medication is active on med list        propranolol (INDERAL) 40 MG tablet 180 tablet 0 5/2/2019       Last Written Prescription Date:  05/02/2019  Last Fill Quantity: 180,  # refills: 0   Last office visit: 4/30/2019 with prescribing provider:  Dr. Mccartney   Future Office Visit:  Unknown     "

## 2019-07-12 DIAGNOSIS — M25.562 PAIN IN BOTH KNEES, UNSPECIFIED CHRONICITY: ICD-10-CM

## 2019-07-12 DIAGNOSIS — R25.1 TREMOR: ICD-10-CM

## 2019-07-12 DIAGNOSIS — M25.561 PAIN IN BOTH KNEES, UNSPECIFIED CHRONICITY: ICD-10-CM

## 2019-07-12 RX ORDER — PROPRANOLOL HYDROCHLORIDE 40 MG/1
TABLET ORAL
Qty: 180 TABLET | Refills: 0 | Status: SHIPPED | OUTPATIENT
Start: 2019-07-12 | End: 2019-11-24

## 2019-07-12 RX ORDER — CELECOXIB 100 MG/1
CAPSULE ORAL
Qty: 60 CAPSULE | Refills: 0 | Status: SHIPPED | OUTPATIENT
Start: 2019-07-12 | End: 2019-11-02

## 2019-07-12 NOTE — TELEPHONE ENCOUNTER
"Requested Prescriptions   Pending Prescriptions Disp Refills     celecoxib (CELEBREX) 100 MG capsule [Pharmacy Med Name: CELECOXIB 100MG CAPSULES] 60 capsule 0     Sig: TAKE 1 TO 2 CAPSULES(100  MG) BY MOUTH DAILY AS NEEDED FOR MODERATE PAIN   Last Written Prescription Date:  4/30/2019  Last Fill Quantity: 60,  # refills: 1   Last office visit: 4/30/2019 with prescribing provider:  Bib   Future Office Visit:   Next 5 appointments (look out 90 days)    Jul 31, 2019 10:00 AM CDT  Office Visit with Kaylene Mccartney MD  [facility] ([address] [address] [phone]             NSAID Medications Failed - 7/12/2019  3:45 PM        Failed - Normal CBC on file in past 12 months     No lab results found.              Passed - Blood pressure under 140/90 in past 12 months     BP Readings from Last 3 Encounters:   04/30/19 98/54   04/17/19 110/60   02/25/19 109/71                 Passed - Normal ALT on file in past 12 months     Recent Labs   Lab Test 04/17/19  1023   ALT 23             Passed - Normal AST on file in past 12 months     Recent Labs   Lab Test 04/17/19  1023   AST 20             Passed - Recent (12 mo) or future (30 days) visit within the authorizing provider's specialty     Patient had office visit in the last 12 months or has a visit in the next 30 days with authorizing provider or within the authorizing provider's specialty.  See \"Patient Info\" tab in inbasket, or \"Choose Columns\" in Meds & Orders section of the refill encounter.              Passed - Patient is age 6-64 years        Passed - Medication is active on med list        Passed - No active pregnancy on record        Passed - Normal serum creatinine on file in past 12 months     Recent Labs   Lab Test 04/17/19  1023   CR 0.76             Passed - No positive pregnancy test in past 12 months        propranolol (INDERAL) 40 MG tablet [Pharmacy Med Name: " "PROPRANOLOL 40MG TABLETS] 180 tablet 0     Sig: TAKE 1 TABLET BY MOUTH TWICE DAILY   Last Written Prescription Date:  5/2/2019  Last Fill Quantity: 180,  # refills: 0   Last office visit: 4/30/2019 with prescribing provider:  Bbi   Future Office Visit:   Next 5 appointments (look out 90 days)    Jul 31, 2019 10:00 AM CDT  Office Visit with Kaylene Mccartney MD  [facility] ([address] [id number] [phone]             Beta-Blockers Protocol Passed - 7/12/2019  3:45 PM        Passed - Blood pressure under 140/90 in past 12 months     BP Readings from Last 3 Encounters:   04/30/19 98/54   04/17/19 110/60   02/25/19 109/71                 Passed - Patient is age 6 or older        Passed - Recent (12 mo) or future (30 days) visit within the authorizing provider's specialty     Patient had office visit in the last 12 months or has a visit in the next 30 days with authorizing provider or within the authorizing provider's specialty.  See \"Patient Info\" tab in inbasket, or \"Choose Columns\" in Meds & Orders section of the refill encounter.              Passed - Medication is active on med list        Patient went on a business trip today and forgot medications at home.  Patient is currently standing at pharmacy in [place] waiting for Rx's.  Will send a 30 day supply of celebrex as patient has an appointment scheduled for 7/31/2019 with PCP.    DAVIS EdenN, RN  [employer] [facility]    "

## 2019-07-31 ENCOUNTER — OFFICE VISIT (OUTPATIENT)
Dept: FAMILY MEDICINE | Facility: CLINIC | Age: 64
End: 2019-07-31
Payer: COMMERCIAL

## 2019-07-31 VITALS
TEMPERATURE: 98.2 F | DIASTOLIC BLOOD PRESSURE: 70 MMHG | OXYGEN SATURATION: 100 % | BODY MASS INDEX: 29.62 KG/M2 | SYSTOLIC BLOOD PRESSURE: 110 MMHG | HEART RATE: 56 BPM | WEIGHT: 167.2 LBS | HEIGHT: 63 IN

## 2019-07-31 DIAGNOSIS — M25.559 HIP PAIN, ACUTE, UNSPECIFIED LATERALITY: Primary | ICD-10-CM

## 2019-07-31 DIAGNOSIS — E78.5 HYPERLIPIDEMIA LDL GOAL <160: ICD-10-CM

## 2019-07-31 PROCEDURE — 99214 OFFICE O/P EST MOD 30 MIN: CPT | Performed by: FAMILY MEDICINE

## 2019-07-31 ASSESSMENT — MIFFLIN-ST. JEOR: SCORE: 1277.54

## 2019-07-31 NOTE — PROGRESS NOTES
".Subjective     Tip Deng is a 64 year old female who presents to clinic today for the following health issues:    HPI     Medication Followup of Celebrex/ Zocor     Taking Medication as prescribed: cut dose in half     Side Effects:  None    Medication Helping Symptoms:  NO-needs to increase dose            Hyperlipidemia Follow-Up      Are you having any of the following symptoms? (Select all that apply)  No complaints of shortness of breath, chest pain or pressure.  No increased sweating or nausea with activity.  No left-sided neck or arm pain.  No complaints of pain in calves when walking 1-2 blocks.    Are you regularly taking any medication or supplement to lower your cholesterol?   No    Are you having muscle aches or other side effects that you think could be caused by your cholesterol lowering medication?  No        Amount of exercise or physical activity: 4-5 days/week for an average of 30-45 minutes    Problems taking medications regularly: No    Medication side effects: none    Diet: low fat/cholesterol          Having bilateral hip pain x 2 months ago - pain is intermittent - sometimes sharp sometimes deep ache.    She thinks  since she has cut down on her celebrex,  her aches and pain may have been  worse, not sure if it is related to statin bc she did started  all the same time.  She feels so body sort of aches, but mostly her knees and hip that hurt the most ,and feels very stiff sometimes.  She denies any fevers or night sweats.   She is s/p hip replacement man years ago. Her knees also have bothered her, \"they always hurt\"  but lately her hip  Has agaravated reecntly. . She is planning to go back to [place] to see her Ortho but just concerned there could be something else going on to explain her aches and pains and stiffness.       Patient Active Problem List   Diagnosis     Tremor     Recurrent UTI     Postmenopausal hormone replacement therapy     Hot flashes     Hyperlipidemia LDL goal " <160     Pain in both knees, unspecified chronicity     Past Surgical History:   Procedure Laterality Date      SECTION            HIP SURGERY      2016 , both hip replaced        Social History     Tobacco Use     Smoking status: Former Smoker     Smokeless tobacco: Never Used   Substance Use Topics     Alcohol use: No     Frequency: Never     Family History   Problem Relation Age of Onset     Coronary Artery Disease Mother         mom had bypass at age 80+      Hyperlipidemia Father         on meds      Coronary Artery Disease Other         PGF, had bypass at age 70      Diabetes No family hx of      Cerebrovascular Disease No family hx of      Colon Cancer No family hx of      Breast Cancer No family hx of            Reviewed and updated as needed this visit by Provider         Review of Systems   ROS COMP: Constitutional, HEENT, cardiovascular, pulmonary, GI, , musculoskeletal, neuro, skin, endocrine and psych systems are negative, except as otherwise noted.      Objective    There were no vitals taken for this visit.  There is no height or weight on file to calculate BMI.  Physical Exam   GENERAL: healthy, alert and no distress  EYES: Eyes grossly normal to inspection  NECK: no adenopathy, no asymmetry, masses, or scars and thyroid normal to palpation  RESP: lungs clear to auscultation - no rales, rhonchi or wheezes  CV: regular rate and rhythm, normal S1 S2, no S3 or S4, no murmur, click or rub, no peripheral edema and peripheral pulses strong  ABDOMEN: soft, nontender, no hepatosplenomegaly, no masses and bowel sounds normal  MS:  no leg edema. rt hip with slight decreased range of motion  and tenderness to palpation minimally around the gluteal groin area.  Range of motion aggravates some discomfort.    SKIN: no suspicious lesions or rashes  NEURO: Normal strength and tone, mentation intact and speech normal  PSYCH: mentation appears normal, affect normal/bright            Assessment & Plan  "    (M25.794) Hip pain, acute, unspecified laterality  (primary encounter diagnosis)  Comment: left more then right   Plan: Lipid panel reflex to direct LDL Fasting,         **Comprehensive metabolic panel FUTURE 1yr, CK         total, ESR: Erythrocyte sedimentation rate,         Rheumatoid factor, **CBC with platelets FUTURE         anytime, Cyclic Citrullinated Peptide Antibody         IgG, CRP, inflammation, ORTHOPEDICS ADULT         REFERRAL          Discussed possible differential diagnosis for her symptoms.  She was to rule out any hematological problem.  Check labs.   Blood tests all remain negative she will go back to do follow-up with the orthopedic. Cares and symptomatic treatment discussed follow up if problem       (E78.5) Hyperlipidemia LDL goal <160  Comment:   Plan: Lipid panel reflex to direct LDL Fasting,         **Comprehensive metabolic panel FUTURE 1yr, CK         total        Return for fasting lab    Check labs.Cares and  treatment discussed follow. up if problem   Patient expressed understanding and agreement with treatment plan. All patient's questions were answered, will let me know if has more later.         BMI:   Estimated body mass index is 29.62 kg/m  as calculated from the following:    Height as of this encounter: 1.6 m (5' 3\").    Weight as of this encounter: 75.8 kg (167 lb 3.2 oz).   Weight management plan: Discussed healthy diet and exercise guidelines        Return in about 3 months (around 10/31/2019), or sooner if needed .    Kaylene Mccartney MD  [facility] [place]      "

## 2019-08-02 ENCOUNTER — ANCILLARY PROCEDURE (OUTPATIENT)
Dept: GENERAL RADIOLOGY | Facility: CLINIC | Age: 64
End: 2019-08-02
Attending: FAMILY MEDICINE
Payer: COMMERCIAL

## 2019-08-02 ENCOUNTER — OFFICE VISIT (OUTPATIENT)
Dept: ORTHOPEDICS | Facility: CLINIC | Age: 64
End: 2019-08-02
Payer: COMMERCIAL

## 2019-08-02 DIAGNOSIS — Z96.643 S/P HIP REPLACEMENT, BILATERAL: ICD-10-CM

## 2019-08-02 DIAGNOSIS — M25.559 HIP PAIN, ACUTE, UNSPECIFIED LATERALITY: ICD-10-CM

## 2019-08-02 DIAGNOSIS — M25.551 HIP PAIN, BILATERAL: ICD-10-CM

## 2019-08-02 DIAGNOSIS — M25.552 HIP PAIN, BILATERAL: ICD-10-CM

## 2019-08-02 DIAGNOSIS — M25.552 HIP PAIN, BILATERAL: Primary | ICD-10-CM

## 2019-08-02 DIAGNOSIS — M25.551 HIP PAIN, BILATERAL: Primary | ICD-10-CM

## 2019-08-02 DIAGNOSIS — E78.5 HYPERLIPIDEMIA LDL GOAL <160: ICD-10-CM

## 2019-08-02 LAB
ALBUMIN SERPL-MCNC: 3.9 G/DL (ref 3.4–5)
ALP SERPL-CCNC: 60 U/L (ref 40–150)
ALT SERPL W P-5'-P-CCNC: 30 U/L (ref 0–50)
ANION GAP SERPL CALCULATED.3IONS-SCNC: 7 MMOL/L (ref 3–14)
AST SERPL W P-5'-P-CCNC: 23 U/L (ref 0–45)
BILIRUB SERPL-MCNC: 1.1 MG/DL (ref 0.2–1.3)
BUN SERPL-MCNC: 22 MG/DL (ref 7–30)
CALCIUM SERPL-MCNC: 9 MG/DL (ref 8.5–10.1)
CHLORIDE SERPL-SCNC: 107 MMOL/L (ref 94–109)
CHOLEST SERPL-MCNC: 231 MG/DL
CK SERPL-CCNC: 75 U/L (ref 30–225)
CO2 SERPL-SCNC: 27 MMOL/L (ref 20–32)
CREAT SERPL-MCNC: 0.73 MG/DL (ref 0.52–1.04)
CRP SERPL-MCNC: <2.9 MG/L (ref 0–8)
ERYTHROCYTE [DISTWIDTH] IN BLOOD BY AUTOMATED COUNT: 12.7 % (ref 10–15)
ERYTHROCYTE [SEDIMENTATION RATE] IN BLOOD BY WESTERGREN METHOD: 9 MM/H (ref 0–30)
GFR SERPL CREATININE-BSD FRML MDRD: 87 ML/MIN/{1.73_M2}
GLUCOSE SERPL-MCNC: 112 MG/DL (ref 70–99)
HCT VFR BLD AUTO: 37.8 % (ref 35–47)
HDLC SERPL-MCNC: 69 MG/DL
HGB BLD-MCNC: 12.6 G/DL (ref 11.7–15.7)
LDLC SERPL CALC-MCNC: 136 MG/DL
MCH RBC QN AUTO: 30.4 PG (ref 26.5–33)
MCHC RBC AUTO-ENTMCNC: 33.3 G/DL (ref 31.5–36.5)
MCV RBC AUTO: 91 FL (ref 78–100)
NONHDLC SERPL-MCNC: 162 MG/DL
PLATELET # BLD AUTO: 223 10E9/L (ref 150–450)
POTASSIUM SERPL-SCNC: 4.7 MMOL/L (ref 3.4–5.3)
PROT SERPL-MCNC: 7 G/DL (ref 6.8–8.8)
RBC # BLD AUTO: 4.15 10E12/L (ref 3.8–5.2)
SODIUM SERPL-SCNC: 141 MMOL/L (ref 133–144)
TRIGL SERPL-MCNC: 129 MG/DL
WBC # BLD AUTO: 3.3 10E9/L (ref 4–11)

## 2019-08-02 PROCEDURE — 86200 CCP ANTIBODY: CPT | Performed by: FAMILY MEDICINE

## 2019-08-02 PROCEDURE — 82550 ASSAY OF CK (CPK): CPT | Performed by: FAMILY MEDICINE

## 2019-08-02 PROCEDURE — 86431 RHEUMATOID FACTOR QUANT: CPT | Performed by: FAMILY MEDICINE

## 2019-08-02 PROCEDURE — 80061 LIPID PANEL: CPT | Performed by: FAMILY MEDICINE

## 2019-08-02 PROCEDURE — 99204 OFFICE O/P NEW MOD 45 MIN: CPT | Performed by: FAMILY MEDICINE

## 2019-08-02 PROCEDURE — 86140 C-REACTIVE PROTEIN: CPT | Performed by: FAMILY MEDICINE

## 2019-08-02 PROCEDURE — 85027 COMPLETE CBC AUTOMATED: CPT | Performed by: FAMILY MEDICINE

## 2019-08-02 PROCEDURE — 73523 X-RAY EXAM HIPS BI 5/> VIEWS: CPT | Mod: FY

## 2019-08-02 PROCEDURE — 80053 COMPREHEN METABOLIC PANEL: CPT | Performed by: FAMILY MEDICINE

## 2019-08-02 PROCEDURE — 85652 RBC SED RATE AUTOMATED: CPT | Performed by: FAMILY MEDICINE

## 2019-08-02 PROCEDURE — 36415 COLL VENOUS BLD VENIPUNCTURE: CPT | Performed by: FAMILY MEDICINE

## 2019-08-02 ASSESSMENT — MIFFLIN-ST. JEOR: SCORE: 1281.17

## 2019-08-02 ASSESSMENT — ENCOUNTER SYMPTOMS: BACK PAIN: 0

## 2019-08-02 NOTE — PROGRESS NOTES
HPI     [facility]   Clinic Visit s Aug 2, 2019    PCP: Jasmin, Idalia David Fatemeh Whelan is a 64 year old female who is seen in consultation at the request of Dr. Mccartney for   Chief Complaint   Patient presents with     Left Hip - Pain     Right Hip - Pain       Injury: Reports insidious onset without acute precipitating event.     Right hand dominant    Location of Pain: left hip lateral, nonradiating > right hip lateral, nonradiating  Duration of Pain: 2 month(s)  Rating of Pain at worst: 2/10  Rating of Pain Currently: 2/10  Pain is better with: activity avoidance   Pain is worse with: walking   Treatment so far consists of: activity avoidance  Associated symptoms: feeling of instability, catching and weakness of hips and knees with stairs  Recent imaging completed: XR 1/23/18  Prior History of related problems: March 2016 bilateral total hip replacement    Social History: is employed as a/an office work, has standing desk      Past Medical History:   Diagnosis Date     Arthritis     knees and hips      Tremor        Patient Active Problem List    Diagnosis Date Noted     Pain in both knees, unspecified chronicity 04/30/2019     Priority: Medium     osteoarthritis        Hyperlipidemia LDL goal <160 04/17/2019     Priority: Medium     Tremor 02/04/2019     Priority: Medium     Recurrent UTI 02/04/2019     Priority: Medium     Postmenopausal hormone replacement therapy 02/04/2019     Priority: Medium     Hot flashes 02/04/2019     Priority: Medium       Family History   Problem Relation Age of Onset     Coronary Artery Disease Mother         mom had bypass at age 80+      Hyperlipidemia Father         on meds      Coronary Artery Disease Other         PGF, had bypass at age 70      Diabetes No family hx of      Cerebrovascular Disease No family hx of      Colon Cancer No family hx of      Breast Cancer No family hx of        Social History     Socioeconomic History     Marital status:  "     Spouse name: Not on file     Number of children: Not on file     Years of education: Not on file     Highest education level: Not on file   Occupational History     Not on file   Social Needs     Financial resource strain: Not on file     Food insecurity:     Worry: Not on file     Inability: Not on file     Transportation needs:     Medical: Not on file     Non-medical: Not on file   Tobacco Use     Smoking status: Former Smoker     Smokeless tobacco: Never Used   Substance and Sexual Activity     Alcohol use: No     Frequency: Never     Drug use: No       Past Surgical History:   Procedure Laterality Date      SECTION            HIP SURGERY      2016 , both hip replaced            Review of Systems   Musculoskeletal: Positive for joint pain. Negative for back pain.   All other systems reviewed and are negative.        Physical Exam  BP (P) 108/62 (BP Location: Left arm, Patient Position: Sitting, Cuff Size: Adult Regular)   Ht (P) 1.6 m (5' 3\")   Wt (P) 76.2 kg (168 lb)   BMI (P) 29.76 kg/m    Constitutional:well-developed, well-nourished, and in no distress.   Cardiovascular: Intact distal pulses.    Neurological: alert. Gait Normal:   Gait, station, stance, and balance appear normal for age  Skin: Skin is warm and dry.   Psychiatric: Mood and affect normal.   Respiratory: unlabored, speaks in full sentences  Lymph: no LAD, no lymphangitis        Right Hip Exam     Tenderness   The patient is experiencing tenderness in the greater trochanter.    Range of Motion   Abduction: normal   Adduction: normal   Flexion: normal   External rotation: normal     Muscle Strength   Abduction: 4/5   Adduction: 5/5   Flexion: 5/5     Tests   ELIZABETH: negative    Other   Erythema: absent  Scars: present  Sensation: normal  Pulse: present      Left Hip Exam     Tenderness   The patient is experiencing no tenderness.     Range of Motion   Abduction: normal   Adduction: normal   Flexion: normal   External " rotation: normal   Internal rotation: normal     Muscle Strength   Abduction: 4/5   Adduction: 5/5   Flexion: 5/5     Tests   ELIZABETH: negative    Other   Erythema: absent  Scars: present  Sensation: normal  Pulse: present            X-ray images Ordered and independently reviewed by me in the office today with the patient. X-ray shows: Intact bilateral hip replacements, but there is some concern for loosening in the distal pole of the left prosthesis.  Radiology report pending.    ASSESSMENT/PLAN    ICD-10-CM    1. Hip pain, bilateral M25.551 XR Pelvis and Hip Bilateral 2 Views    M25.552 NM Bone scan 3 phase   2. S/P hip replacement, bilateral Z96.643 XR Pelvis and Hip Bilateral 2 Views     NM Bone scan 3 phase     Given acute intermittent pain which sounds like instability, and suspicious x-ray at least on the left side as above, will proceed with bone scan to assess for loosening of prosthesis.  Will notify patient of results via my chart, if positive will need to return to surgeon, if negative will recommend physical therapy.  Patient aware of this plan.

## 2019-08-02 NOTE — LETTER
8/2/2019         RE: Tip Deng  [address]   [place] [address]        Dear Colleague,    Thank you for referring your patient, Tip Deng, to the [facility]. Please see a copy of my visit note below.    HPI     [facility]   Clinic Visit s Aug 2, 2019    PCP: [facility], [facility]      Tip is a 64 year old female who is seen in consultation at the request of Dr. Mccartney for   Chief Complaint   Patient presents with     Left Hip - Pain     Right Hip - Pain       Injury: Reports insidious onset without acute precipitating event.     Right hand dominant    Location of Pain: left hip lateral, nonradiating > right hip lateral, nonradiating  Duration of Pain: 2 month(s)  Rating of Pain at worst: 2/10  Rating of Pain Currently: 2/10  Pain is better with: activity avoidance   Pain is worse with: walking   Treatment so far consists of: activity avoidance  Associated symptoms: feeling of instability, catching and weakness of hips and knees with stairs  Recent imaging completed: XR 1/23/18  Prior History of related problems: March 2016 bilateral total hip replacement    Social History: is employed as a/an office work, has standing desk      Past Medical History:   Diagnosis Date     Arthritis     knees and hips      Tremor        Patient Active Problem List    Diagnosis Date Noted     Pain in both knees, unspecified chronicity 04/30/2019     Priority: Medium     osteoarthritis        Hyperlipidemia LDL goal <160 04/17/2019     Priority: Medium     Tremor 02/04/2019     Priority: Medium     Recurrent UTI 02/04/2019     Priority: Medium     Postmenopausal hormone replacement therapy 02/04/2019     Priority: Medium     Hot flashes 02/04/2019     Priority: Medium       Family History   Problem Relation Age of Onset     Coronary Artery Disease Mother         mom had bypass at age 80+      Hyperlipidemia Father         on meds       "Coronary Artery Disease Other         PGF, had bypass at age 70      Diabetes No family hx of      Cerebrovascular Disease No family hx of      Colon Cancer No family hx of      Breast Cancer No family hx of        Social History     Socioeconomic History     Marital status:      Spouse name: Not on file     Number of children: Not on file     Years of education: Not on file     Highest education level: Not on file   Occupational History     Not on file   Social Needs     Financial resource strain: Not on file     Food insecurity:     Worry: Not on file     Inability: Not on file     Transportation needs:     Medical: Not on file     Non-medical: Not on file   Tobacco Use     Smoking status: Former Smoker     Smokeless tobacco: Never Used   Substance and Sexual Activity     Alcohol use: No     Frequency: Never     Drug use: No       Past Surgical History:   Procedure Laterality Date      SECTION      1994      HIP SURGERY      2016 , both hip replaced            Review of Systems   Musculoskeletal: Positive for joint pain. Negative for back pain.   All other systems reviewed and are negative.        Physical Exam  BP (P) 108/62 (BP Location: Left arm, Patient Position: Sitting, Cuff Size: Adult Regular)   Ht (P) 1.6 m (5' 3\")   Wt (P) 76.2 kg (168 lb)   BMI (P) 29.76 kg/m     Constitutional:well-developed, well-nourished, and in no distress.   Cardiovascular: Intact distal pulses.    Neurological: alert. Gait Normal:   Gait, station, stance, and balance appear normal for age  Skin: Skin is warm and dry.   Psychiatric: Mood and affect normal.   Respiratory: unlabored, speaks in full sentences  Lymph: no LAD, no lymphangitis        Right Hip Exam     Tenderness   The patient is experiencing tenderness in the greater trochanter.    Range of Motion   Abduction: normal   Adduction: normal   Flexion: normal   External rotation: normal     Muscle Strength   Abduction: 4/5   Adduction: 5/5   Flexion: 5/5 "     Tests   ELIZABETH: negative    Other   Erythema: absent  Scars: present  Sensation: normal  Pulse: present      Left Hip Exam     Tenderness   The patient is experiencing no tenderness.     Range of Motion   Abduction: normal   Adduction: normal   Flexion: normal   External rotation: normal   Internal rotation: normal     Muscle Strength   Abduction: 4/5   Adduction: 5/5   Flexion: 5/5     Tests   ELIZABETH: negative    Other   Erythema: absent  Scars: present  Sensation: normal  Pulse: present            X-ray images Ordered and independently reviewed by me in the office today with the patient. X-ray shows: Intact bilateral hip replacements, but there is some concern for loosening in the distal pole of the left prosthesis.  Radiology report pending.    ASSESSMENT/PLAN    ICD-10-CM    1. Hip pain, bilateral M25.551 XR Pelvis and Hip Bilateral 2 Views    M25.552 NM Bone scan 3 phase   2. S/P hip replacement, bilateral Z96.643 XR Pelvis and Hip Bilateral 2 Views     NM Bone scan 3 phase     Given acute intermittent pain which sounds like instability, and suspicious x-ray at least on the left side as above, will proceed with bone scan to assess for loosening of prosthesis.  Will notify patient of results via my chart, if positive will need to return to surgeon, if negative will recommend physical therapy.  Patient aware of this plan.    Again, thank you for allowing me to participate in the care of your patient.        Sincerely,        Jovan Valencia MD

## 2019-08-05 LAB
CCP AB SER IA-ACNC: 1 U/ML
RHEUMATOID FACT SER NEPH-ACNC: <20 IU/ML (ref 0–20)

## 2019-08-09 ENCOUNTER — HOSPITAL ENCOUNTER (OUTPATIENT)
Dept: NUCLEAR MEDICINE | Facility: CLINIC | Age: 64
Setting detail: NUCLEAR MEDICINE
Discharge: HOME | End: 2019-08-09
Attending: FAMILY MEDICINE | Admitting: FAMILY MEDICINE
Payer: COMMERCIAL

## 2019-08-09 ENCOUNTER — HOSPITAL ENCOUNTER (OUTPATIENT)
Dept: NUCLEAR MEDICINE | Facility: CLINIC | Age: 64
Setting detail: NUCLEAR MEDICINE
End: 2019-08-09
Attending: FAMILY MEDICINE
Payer: COMMERCIAL

## 2019-08-09 DIAGNOSIS — M25.551 HIP PAIN, BILATERAL: ICD-10-CM

## 2019-08-09 DIAGNOSIS — Z96.643 S/P HIP REPLACEMENT, BILATERAL: ICD-10-CM

## 2019-08-09 DIAGNOSIS — M25.552 HIP PAIN, BILATERAL: ICD-10-CM

## 2019-08-09 PROCEDURE — A9503 TC99M MEDRONATE: HCPCS | Performed by: FAMILY MEDICINE

## 2019-08-09 PROCEDURE — 34300033 ZZH RX 343: Performed by: FAMILY MEDICINE

## 2019-08-09 PROCEDURE — 78315 BONE IMAGING 3 PHASE: CPT

## 2019-08-09 RX ORDER — TC 99M MEDRONATE 20 MG/10ML
25.2 INJECTION, POWDER, LYOPHILIZED, FOR SOLUTION INTRAVENOUS ONCE
Status: COMPLETED | OUTPATIENT
Start: 2019-08-09 | End: 2019-08-09

## 2019-08-09 RX ADMIN — TC 99M MEDRONATE 25.2 MCI.: 20 INJECTION, POWDER, LYOPHILIZED, FOR SOLUTION INTRAVENOUS at 09:00

## 2019-08-14 ENCOUNTER — MYC MEDICAL ADVICE (OUTPATIENT)
Dept: ORTHOPEDICS | Facility: CLINIC | Age: 64
End: 2019-08-14

## 2019-08-14 DIAGNOSIS — M25.551 HIP PAIN, BILATERAL: Primary | ICD-10-CM

## 2019-08-14 DIAGNOSIS — Z96.643 S/P HIP REPLACEMENT, BILATERAL: ICD-10-CM

## 2019-08-14 DIAGNOSIS — T84.039A: ICD-10-CM

## 2019-08-14 DIAGNOSIS — M25.552 HIP PAIN, BILATERAL: Primary | ICD-10-CM

## 2019-09-04 DIAGNOSIS — E78.5 HYPERLIPIDEMIA LDL GOAL <160: ICD-10-CM

## 2019-09-04 RX ORDER — SIMVASTATIN 10 MG
TABLET ORAL
Qty: 90 TABLET | Refills: 3 | Status: SHIPPED | OUTPATIENT
Start: 2019-09-04 | End: 2020-08-21

## 2019-09-04 NOTE — TELEPHONE ENCOUNTER
"Last Written Prescription Date:  4/30/19  Last Fill Quantity: 30 tablets,  # refills: 3   Last office visit: 7/31/2019 with prescribing provider:  Bib   Future Office Visit:      Requested Prescriptions   Pending Prescriptions Disp Refills     simvastatin (ZOCOR) 10 MG tablet [Pharmacy Med Name: SIMVASTATIN 10MG TABLETS] 30 tablet 0     Sig: TAKE 1 TABLET(10 MG) BY MOUTH AT BEDTIME       Statins Protocol Passed - 9/4/2019  9:09 AM        Passed - LDL on file in past 12 months     Recent Labs   Lab Test 08/02/19  1033   *             Passed - No abnormal creatine kinase in past 12 months     Recent Labs   Lab Test 08/02/19  1033   CKT 75                Passed - Recent (12 mo) or future (30 days) visit within the authorizing provider's specialty     Patient had office visit in the last 12 months or has a visit in the next 30 days with authorizing provider or within the authorizing provider's specialty.  See \"Patient Info\" tab in inbasket, or \"Choose Columns\" in Meds & Orders section of the refill encounter.              Passed - Medication is active on med list        Passed - Patient is age 18 or older        Passed - No active pregnancy on record        Passed - No positive pregnancy test in past 12 months          "

## 2019-09-11 ENCOUNTER — PRE VISIT (OUTPATIENT)
Dept: ORTHOPEDICS | Facility: CLINIC | Age: 64
End: 2019-09-11

## 2019-09-11 DIAGNOSIS — M54.5 LOW BACK PAIN, UNSPECIFIED BACK PAIN LATERALITY, UNSPECIFIED CHRONICITY, WITH SCIATICA PRESENCE UNSPECIFIED: Primary | ICD-10-CM

## 2019-09-11 NOTE — TELEPHONE ENCOUNTER
PREVISIT INFORMATION                                                    Tip Deng scheduled for future visit at [facility] specialty clinics.        Patient is scheduled to see Dr. López on 9/17  Reason for visit: Bilateral hip pain  Referring provider Erik  Has patient seen previous specialist? No  Medical Records:  [facility] records requested for surgery from 3/21/2016.  Records received     REVIEW                                                      New patient packet mailed to patient: Yes  Medication reconciliation complete: Yes      Current Outpatient Medications   Medication Sig Dispense Refill     celecoxib (CELEBREX) 100 MG capsule TAKE 1 TO 2 CAPSULES(100  MG) BY MOUTH DAILY AS NEEDED FOR MODERATE PAIN 60 capsule 0     ciprofloxacin (CIPRO) 250 MG tablet TAKE 1 T PO AFTER INTERCOURSE AS DIRECTED 20 tablet 0     propranolol (INDERAL) 40 MG tablet TAKE 1 TABLET BY MOUTH TWICE DAILY 180 tablet 0     simvastatin (ZOCOR) 10 MG tablet TAKE 1 TABLET(10 MG) BY MOUTH AT BEDTIME 90 tablet 3       Allergies: Patient has no known allergies.    1. Have you had any recent xrays in the last 6 months? Yes - Xray are in EPIC.    2. Have you had an MRI? No.     3. Have you had any surgery in past related to complaint?  Yes - Patient informed that clinic needs all  information sent to the clinic before the visit to review with Dr. López.  This includes any and all x-rays from surgery and operative reports.  Patient informed if these records are not received ahead of time, that the appointment will need to be rescheduled until obtained.  Patient informed that they are responsible to get these records to the clinic.  and No.  If yes, Patient advised that implant stickers are needed for any previous total joint surgery as well for appointment.    PLAN/FOLLOW-UP NEEDED                                                      Previsit review complete.  Patient will see provider at future scheduled  appointment.     Patient Reminders Given:  Please, make sure you bring an updated list of your medications.   If you are having a procedure, please, present 15 minutes early.  If you need to cancel or reschedule,please call [phone].    Miriam Matthews RN

## 2019-09-17 ENCOUNTER — TELEPHONE (OUTPATIENT)
Dept: ORTHOPEDICS | Facility: CLINIC | Age: 64
End: 2019-09-17

## 2019-09-17 ENCOUNTER — OFFICE VISIT (OUTPATIENT)
Dept: ORTHOPEDICS | Facility: CLINIC | Age: 64
End: 2019-09-17
Payer: COMMERCIAL

## 2019-09-17 ENCOUNTER — ANCILLARY PROCEDURE (OUTPATIENT)
Dept: GENERAL RADIOLOGY | Facility: CLINIC | Age: 64
End: 2019-09-17
Attending: ORTHOPAEDIC SURGERY
Payer: COMMERCIAL

## 2019-09-17 VITALS
OXYGEN SATURATION: 97 % | HEIGHT: 64 IN | SYSTOLIC BLOOD PRESSURE: 92 MMHG | DIASTOLIC BLOOD PRESSURE: 58 MMHG | WEIGHT: 168.3 LBS | HEART RATE: 54 BPM | BODY MASS INDEX: 28.73 KG/M2

## 2019-09-17 DIAGNOSIS — M54.5 LOW BACK PAIN, UNSPECIFIED BACK PAIN LATERALITY, UNSPECIFIED CHRONICITY, WITH SCIATICA PRESENCE UNSPECIFIED: ICD-10-CM

## 2019-09-17 DIAGNOSIS — M47.816 LUMBAR FACET ARTHROPATHY: Primary | ICD-10-CM

## 2019-09-17 DIAGNOSIS — M70.62 TROCHANTERIC BURSITIS OF LEFT HIP: ICD-10-CM

## 2019-09-17 PROCEDURE — 99203 OFFICE O/P NEW LOW 30 MIN: CPT | Performed by: ORTHOPAEDIC SURGERY

## 2019-09-17 PROCEDURE — 72100 X-RAY EXAM L-S SPINE 2/3 VWS: CPT | Performed by: RADIOLOGY

## 2019-09-17 ASSESSMENT — MIFFLIN-ST. JEOR: SCORE: 1290.46

## 2019-09-17 ASSESSMENT — PAIN SCALES - GENERAL: PAINLEVEL: MILD PAIN (2)

## 2019-09-17 NOTE — TELEPHONE ENCOUNTER
9/17 Instructed patient to call [phone] to call and schedule physical therapy.     Paulette Mccloud   Procedure    Ortho/Sports Med/Ent/Eye   [facility]   [phone]

## 2019-09-17 NOTE — LETTER
9/17/2019         RE: Tip Deng  [address] [place] [address]        Dear Colleague,    Thank you for referring your patient, Tip Deng, to the [facility]. Please see a copy of my visit note below.    Chief Complaint: No chief complaint on file.  bilateral hip pain 3 1/2 years after B total hip    Physician:  Jovan Valencia    HPI: Tip Deng is a 64 year old female who presents today for evaluation of her hips.  replacements.     Symptom Profile  Location of symptoms:  Lateral hips also noticed that she has noticed loss of flexibility in yoga class.   Onset: insidious   Trend: getting   Duration of symptoms: about 2 months   Quality of symptoms: sharp and stabbing   Severity: 2/10  Alleviate:activity modification   Exacerbating: activities   Previous Treatments: Previous treatments include activity modification, oral pain medication (take s celebrex for her hips and knees)    Current Status:  Results of the patient s Hip Disability and Osteoarthritis Outcome Score (HOOS)  are as follows (0-100 scales with 100 being the theoretical best):  Pain: 92.5  Symptoms:85  ADLs:84  Sports/Recreation:56.25  Quality of Life:50  (http://koos.nu/)  [facility] Activity Score: 7    MEDICAL HISTORY:   Past Medical History:   Diagnosis Date     Arthritis     knees and hips      Tremor        Medications:     Current Outpatient Medications:      celecoxib (CELEBREX) 100 MG capsule, TAKE 1 TO 2 CAPSULES(100  MG) BY MOUTH DAILY AS NEEDED FOR MODERATE PAIN, Disp: 60 capsule, Rfl: 0     ciprofloxacin (CIPRO) 250 MG tablet, TAKE 1 T PO AFTER INTERCOURSE AS DIRECTED, Disp: 20 tablet, Rfl: 0     propranolol (INDERAL) 40 MG tablet, TAKE 1 TABLET BY MOUTH TWICE DAILY, Disp: 180 tablet, Rfl: 0     simvastatin (ZOCOR) 10 MG tablet, TAKE 1 TABLET(10 MG) BY MOUTH AT BEDTIME, Disp: 90 tablet, Rfl: 3    Allergies: Patient has no known allergies.    SURGICAL HISTORY:   Past Surgical  "History:   Procedure Laterality Date      SECTION            HIP SURGERY      2016 , both hip replaced        FAMILY HISTORY:   Family History   Problem Relation Age of Onset     Coronary Artery Disease Mother         mom had bypass at age 80+      Hyperlipidemia Father         on meds      Coronary Artery Disease Other         PGF, had bypass at age 70      Diabetes No family hx of      Cerebrovascular Disease No family hx of      Colon Cancer No family hx of      Breast Cancer No family hx of        SOCIAL HISTORY:   Social History     Tobacco Use     Smoking status: Former Smoker     Smokeless tobacco: Never Used   Substance Use Topics     Alcohol use: No     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  No fever, weight change or fatigue. No dry eyes. No oral ulcers, sore throat or voice change. No palpitations, syncope, angina or edema.  No chest pain, excessive sleepiness, shortness of breath or hemoptysis.   No abdominal pain, nausea, vomiting, diarrhea or heartburn.  No skin rash. No focal weakness or numbness. No bleeding or lymphadenopathy. No rhinitis or hives.     Exam:  On physical examination the patient appears the stated age, is in no acute distress, affectThe is appropriate, and breathing is non-labored.  Vitals are documented in the EMR and have been reviewed:    There were no vitals taken for this visit.  Data Unavailable  There is no height or weight on file to calculate BMI.   Ht (P) 1.6 m (5' 3\")   Wt (P) 76.2 kg (168 lb)   BMI (P) 29.76 kg/m     Rises from chair:easily   Gait: normal   Gains the exam table: easily     RIGHT hip subjective: not irritated  Abd: 20  Add:10  PFC:  Flexion: 100   IRF: 10  ERF:20  Impingement test:-    LEFT hip subjective: symmetric   Abd:  Add:  PFC:  Flexion:  IRF:  ERF:  Impingement test:  TTP at the greater trochanter and this reproduces her " lateral left hip symptoms  Distally, the circulatory, motor, and sensation exam is intact with 5/5 EHL, gastroc-soleus, and tibialis anterior.  Sensation to light touch is intact.  Dorsalis pedis and posterior tibialis pulses are palpable.  There are no sores on the feet, no bruising, and no lymphedema.    X-rays:   These are stable appearing implants. The left hip appears well fixed with complete proximal ingrowth without subsidence.   Bone scans are normal for an ingrowth prosthesis at three years.   Fairly severe facet hypertrophy lumbar spine, radiographs from today   LAB: 8/2/19  SED: 9  CRP: <2.9  Rh:  <20     Assessment: This is a 64 year old with stable implants and symptoms that described as weakness in the hips and knees. Normal labs. Implants are well fixed. Review of her lumbar spine radiographs shows facet joint hypertrophy which is consistent with her symptoms (also with back ache). Lateral hip symptoms are consistent with greater troch sx.     Plan:  Lumbar spine facet joint hypertrophy: observation. Lateral hip greater trochanter symptoms. Discussed a PT program. She will follow-up as needed in medical orthopaedics.       Again, thank you for allowing me to participate in the care of your patient.        Sincerely,        Juan M López MD

## 2019-09-17 NOTE — PATIENT INSTRUCTIONS
Thanks for coming today.  Or[facility] Clinic  [address] [place] [address]    To schedule future appointments in Ortho Clinic, you may call [phone].    To schedule ordered imaging by your provider:   Call Central Imaging [phone]    To schedule an injection ordered by your provider:  Call Central Imaging Injection scheduling line: [phone]  [facility] available online at:  [email or web address]    Please call if any further questions or concerns ([phone]).  Clinic hours 8 am to 5 pm.    Return to clinic (call) if symptoms worsen or fail to improve.

## 2019-09-17 NOTE — PROGRESS NOTES
Chief Complaint: No chief complaint on file.  bilateral hip pain 3 1/2 years after B total hip    Physician:  Jovna Valencia    HPI: Tip Deng is a 64 year old female who presents today for evaluation of her hips.  replacements.     Symptom Profile  Location of symptoms:  Lateral hips also noticed that she has noticed loss of flexibility in yoga class.   Onset: insidious   Trend: getting   Duration of symptoms: about 2 months   Quality of symptoms: sharp and stabbing   Severity: 2/10  Alleviate:activity modification   Exacerbating: activities   Previous Treatments: Previous treatments include activity modification, oral pain medication (take s celebrex for her hips and knees)    Current Status:  Results of the patient s Hip Disability and Osteoarthritis Outcome Score (HOOS)  are as follows (0-100 scales with 100 being the theoretical best):  Pain: 92.5  Symptoms:85  ADLs:84  Sports/Recreation:56.25  Quality of Life:50  (http://koos.nu/)  [facility] Activity Score: 7    MEDICAL HISTORY:   Past Medical History:   Diagnosis Date     Arthritis     knees and hips      Tremor        Medications:     Current Outpatient Medications:      celecoxib (CELEBREX) 100 MG capsule, TAKE 1 TO 2 CAPSULES(100  MG) BY MOUTH DAILY AS NEEDED FOR MODERATE PAIN, Disp: 60 capsule, Rfl: 0     ciprofloxacin (CIPRO) 250 MG tablet, TAKE 1 T PO AFTER INTERCOURSE AS DIRECTED, Disp: 20 tablet, Rfl: 0     propranolol (INDERAL) 40 MG tablet, TAKE 1 TABLET BY MOUTH TWICE DAILY, Disp: 180 tablet, Rfl: 0     simvastatin (ZOCOR) 10 MG tablet, TAKE 1 TABLET(10 MG) BY MOUTH AT BEDTIME, Disp: 90 tablet, Rfl: 3    Allergies: Patient has no known allergies.    SURGICAL HISTORY:   Past Surgical History:   Procedure Laterality Date      SECTION            HIP SURGERY      2016 , both hip replaced        FAMILY HISTORY:   Family History   Problem Relation Age of Onset     Coronary Artery Disease Mother         mom had bypass at age  "80+      Hyperlipidemia Father         on meds      Coronary Artery Disease Other         PGF, had bypass at age 70      Diabetes No family hx of      Cerebrovascular Disease No family hx of      Colon Cancer No family hx of      Breast Cancer No family hx of        SOCIAL HISTORY:   Social History     Tobacco Use     Smoking status: Former Smoker     Smokeless tobacco: Never Used   Substance Use Topics     Alcohol use: No     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  No fever, weight change or fatigue. No dry eyes. No oral ulcers, sore throat or voice change. No palpitations, syncope, angina or edema.  No chest pain, excessive sleepiness, shortness of breath or hemoptysis.   No abdominal pain, nausea, vomiting, diarrhea or heartburn.  No skin rash. No focal weakness or numbness. No bleeding or lymphadenopathy. No rhinitis or hives.     Exam:  On physical examination the patient appears the stated age, is in no acute distress, affectThe is appropriate, and breathing is non-labored.  Vitals are documented in the EMR and have been reviewed:    There were no vitals taken for this visit.  Data Unavailable  There is no height or weight on file to calculate BMI.   Ht (P) 1.6 m (5' 3\")   Wt (P) 76.2 kg (168 lb)   BMI (P) 29.76 kg/m    Rises from chair:easily   Gait: normal   Gains the exam table: easily     RIGHT hip subjective: not irritated  Abd: 20  Add:10  PFC:  Flexion: 100   IRF: 10  ERF:20  Impingement test:-    LEFT hip subjective: symmetric   Abd:  Add:  PFC:  Flexion:  IRF:  ERF:  Impingement test:  TTP at the greater trochanter and this reproduces her lateral left hip symptoms  Distally, the circulatory, motor, and sensation exam is intact with 5/5 EHL, gastroc-soleus, and tibialis anterior.  Sensation to light touch is intact.  Dorsalis pedis and posterior tibialis pulses are palpable.  There are " no sores on the feet, no bruising, and no lymphedema.    X-rays:   These are stable appearing implants. The left hip appears well fixed with complete proximal ingrowth without subsidence.   Bone scans are normal for an ingrowth prosthesis at three years.   Fairly severe facet hypertrophy lumbar spine, radiographs from today   LAB: 8/2/19  SED: 9  CRP: <2.9  Rh:  <20     Assessment: This is a 64 year old with stable implants and symptoms that described as weakness in the hips and knees. Normal labs. Implants are well fixed. Review of her lumbar spine radiographs shows facet joint hypertrophy which is consistent with her symptoms (also with back ache). Lateral hip symptoms are consistent with greater troch sx.     Plan:  Lumbar spine facet joint hypertrophy: observation. Lateral hip greater trochanter symptoms. Discussed a PT program. She will follow-up as needed in medical orthopaedics.

## 2019-09-17 NOTE — NURSING NOTE
"Tip Deng's chief complaint for this visit includes:  Chief Complaint   Patient presents with     Right Hip - Pain     Bilateral hip pain/loose implant - S/p bilateral hip replacement procedures 2016     Left Hip - Pain     Bilateral hip pain/loose implant - S/p bilateral hip replacement procedures 2016     PCP: [facility]    Referring Provider:  Jovan Valencia MD  [address] [place] [address]    BP 92/58 (BP Location: Left arm, Patient Position: Sitting, Cuff Size: Adult Regular)   Pulse 54   Ht 1.613 m (5' 3.5\")   Wt 76.3 kg (168 lb 4.8 oz)   SpO2 97%   BMI 29.35 kg/m    Mild Pain (2)     Do you need any medication refills at today's visit? No    Laurel Lim CMA        "

## 2019-09-20 ENCOUNTER — THERAPY VISIT (OUTPATIENT)
Dept: PHYSICAL THERAPY | Facility: CLINIC | Age: 64
End: 2019-09-20
Attending: ORTHOPAEDIC SURGERY
Payer: COMMERCIAL

## 2019-09-20 DIAGNOSIS — M54.50 BILATERAL LOW BACK PAIN WITHOUT SCIATICA: ICD-10-CM

## 2019-09-20 DIAGNOSIS — M25.551 BILATERAL HIP PAIN: ICD-10-CM

## 2019-09-20 DIAGNOSIS — M25.552 BILATERAL HIP PAIN: ICD-10-CM

## 2019-09-20 DIAGNOSIS — M47.816 LUMBAR FACET ARTHROPATHY: ICD-10-CM

## 2019-09-20 DIAGNOSIS — M70.62 TROCHANTERIC BURSITIS OF LEFT HIP: ICD-10-CM

## 2019-09-20 PROCEDURE — 97110 THERAPEUTIC EXERCISES: CPT | Mod: GP | Performed by: PHYSICAL THERAPIST

## 2019-09-20 PROCEDURE — 97162 PT EVAL MOD COMPLEX 30 MIN: CPT | Mod: GP | Performed by: PHYSICAL THERAPIST

## 2019-09-20 ASSESSMENT — ACTIVITIES OF DAILY LIVING (ADL)
WALKING_APPROXIMATELY_10_MINUTES: NO DIFFICULTY AT ALL
GETTING_INTO_AND_OUT_OF_A_BATHTUB: SLIGHT DIFFICULTY
HOS_ADL_ITEM_SCORE_TOTAL: 54
DEEP_SQUATTING: SLIGHT DIFFICULTY
HOS_ADL_COUNT: 17
WALKING_DOWN_STEEP_HILLS: SLIGHT DIFFICULTY
RECREATIONAL_ACTIVITIES: MODERATE DIFFICULTY
GOING_DOWN_1_FLIGHT_OF_STAIRS: SLIGHT DIFFICULTY
HOS_ADL_HIGHEST_POTENTIAL_SCORE: 68
WALKING_15_MINUTES_OR_GREATER: MODERATE DIFFICULTY
SITTING_FOR_15_MINUTES: NO DIFFICULTY AT ALL
GETTING_INTO_AND_OUT_OF_AN_AVERAGE_CAR: NO DIFFICULTY AT ALL
GOING_UP_1_FLIGHT_OF_STAIRS: NO DIFFICULTY AT ALL
TWISTING/PIVOTING_ON_INVOLVED_LEG: SLIGHT DIFFICULTY
LIGHT_TO_MODERATE_WORK: SLIGHT DIFFICULTY
HEAVY_WORK: MODERATE DIFFICULTY
STANDING_FOR_15_MINUTES: NO DIFFICULTY AT ALL
WALKING_INITIALLY: NO DIFFICULTY AT ALL
PUTTING_ON_SOCKS_AND_SHOES: SLIGHT DIFFICULTY
HOS_ADL_SCORE(%): 79.41
ROLLING_OVER_IN_BED: NO DIFFICULTY AT ALL
STEPPING_UP_AND_DOWN_CURBS: SLIGHT DIFFICULTY
WALKING_UP_STEEP_HILLS: SLIGHT DIFFICULTY

## 2019-09-23 PROBLEM — M25.551 BILATERAL HIP PAIN: Status: ACTIVE | Noted: 2019-09-23

## 2019-09-23 PROBLEM — M25.552 BILATERAL HIP PAIN: Status: ACTIVE | Noted: 2019-09-23

## 2019-09-23 PROBLEM — M54.50 BILATERAL LOW BACK PAIN WITHOUT SCIATICA: Status: ACTIVE | Noted: 2019-09-23

## 2019-09-23 NOTE — PROGRESS NOTES
[facility] Initial Evaluation  Subjective:  Patient is referred with a long hx of B LBP and hip pain. She thinks sxs may be related to decreased overall activity level. Sxs are worse with prolonged walking and better when sitting, bending or lying down. PMH includes simultaneous JOSE CARLOS done in 2016. She has bone scans and x-rays done of both hips.    The history is provided by the patient.   Type of problem:  Lumbar   Condition occurred with:  Insidious onset and degenerative joint disease. This is a chronic condition    Patient reports pain:  Central lumbar spine, lumbar spine left and lumbar spine right. Radiates to:  Gluteals right and gluteals left. Associated symptoms:  Loss of motion/stiffness and loss of strength. Symptoms are exacerbated by bending and walking and relieved by rest.    Tip Deng being seen for LBP, B hip pain.   Where condition occurred: for unknown reasons.  and reported as 0/10 on pain scale. General health as reported by patient is good. Pertinent medical history includes:  Osteoarthritis.    Surgeries include:  Orthopedic surgery (bilateral JOSE CARLOS).  Current medications:  Anti-inflammatory.     Pain is described as aching and is intermittent. Pain is worse during the day. Since onset symptoms are unchanged. Special tests:  X-ray and bone scan.      Restrictions include:  Working in normal job without restrictions.    Barriers include:  None as reported by patient.                        Objective:  System    Physical Exam      Ronel Lumbar Evaluation    Posture:  Sitting: good  Standing: good  Lordosis: Reduced  Lateral Shift: no  Correction of Posture: no effect    Movement Loss:  Flexion (Flex): nil  Extension (EXT): min  Side Glide R (SG R): nil  Side Glide L (SG L): nil  Test Movements:  FIS: During: no effect  After: no effect  Mechanical Response: no effectPretest Movements: LBP  Repeat FIS: During: decreases  After: better  Mechanical Response:  IncROM  EIS: During: increases  After: no worse  Mechanical Response: no effect  Repeat EIS: During: increases  After: no worse  Mechanical Response: no effect  AMY: During: no effect  After: no effect  Mechanical Response: no effect  Repeat AMY: During: no effect  After: no effect  Mechanical Response: no effect          Conclusion: other (DDD)  Principle of Treatment:    Flexion: AMY, FISitting x 10/ 4 x daily      Other: core and hip strengthening                                       ROS    Assessment/Plan:    Patient is a 64 year old female with lumbar and both sides hip complaints.    Patient has the following significant findings with corresponding treatment plan.                Diagnosis 1:  LBP, bilateral hip pain  Pain -  manual therapy, self management, education, directional preference exercise and home program  Decreased ROM/flexibility - manual therapy, therapeutic exercise and home program  Decreased strength - therapeutic exercise, therapeutic activities and home program  Impaired gait - gait training and home program  Impaired muscle performance - neuro re-education and home program  Decreased function - therapeutic activities and home program    Therapy Evaluation Codes:   1) History comprised of:   Personal factors that impact the plan of care:      Time since onset of symptoms.    Comorbidity factors that impact the plan of care are:      None.     Medications impacting care: None.  2) Examination of Body Systems comprised of:   Body structures and functions that impact the plan of care:      Hip and Lumbar spine.   Activity limitations that impact the plan of care are:      Sitting, Sports, Squatting/kneeling, Stairs, Standing and Walking.  3) Clinical presentation characteristics are:   Evolving/Changing.  4) Decision-Making    Moderate complexity using standardized patient assessment instrument and/or measureable assessment of functional outcome.  Cumulative Therapy Evaluation is: Moderate  complexity.    Previous and current functional limitations:  (See Goal Flow Sheet for this information)    Short term and Long term goals: (See Goal Flow Sheet for this information)     Communication ability:  Patient appears to be able to clearly communicate and understand verbal and written communication and follow directions correctly.  Treatment Explanation - The following has been discussed with the patient:   RX ordered/plan of care  Anticipated outcomes  Possible risks and side effects  This patient would benefit from PT intervention to resume normal activities.   Rehab potential is good.    Frequency:  1 X week, once daily  Duration:  for 4 weeks  Discharge Plan:  Achieve all LTG.  Independent in home treatment program.  Reach maximal therapeutic benefit.    Please refer to the daily flowsheet for treatment today, total treatment time and time spent performing 1:1 timed codes.

## 2019-09-25 ENCOUNTER — THERAPY VISIT (OUTPATIENT)
Dept: PHYSICAL THERAPY | Facility: CLINIC | Age: 64
End: 2019-09-25
Payer: COMMERCIAL

## 2019-09-25 DIAGNOSIS — M25.552 BILATERAL HIP PAIN: ICD-10-CM

## 2019-09-25 DIAGNOSIS — M54.50 BILATERAL LOW BACK PAIN WITHOUT SCIATICA: ICD-10-CM

## 2019-09-25 DIAGNOSIS — M25.551 BILATERAL HIP PAIN: ICD-10-CM

## 2019-09-25 PROCEDURE — 97110 THERAPEUTIC EXERCISES: CPT | Mod: GP | Performed by: PHYSICAL THERAPIST

## 2019-09-25 PROCEDURE — 97112 NEUROMUSCULAR REEDUCATION: CPT | Mod: GP | Performed by: PHYSICAL THERAPIST

## 2019-10-02 ENCOUNTER — THERAPY VISIT (OUTPATIENT)
Dept: PHYSICAL THERAPY | Facility: CLINIC | Age: 64
End: 2019-10-02
Attending: ORTHOPAEDIC SURGERY
Payer: COMMERCIAL

## 2019-10-02 DIAGNOSIS — M54.50 BILATERAL LOW BACK PAIN WITHOUT SCIATICA: ICD-10-CM

## 2019-10-02 DIAGNOSIS — M25.551 BILATERAL HIP PAIN: ICD-10-CM

## 2019-10-02 DIAGNOSIS — M25.552 BILATERAL HIP PAIN: ICD-10-CM

## 2019-10-02 PROCEDURE — 97110 THERAPEUTIC EXERCISES: CPT | Mod: GP | Performed by: PHYSICAL THERAPIST

## 2019-10-02 PROCEDURE — 97112 NEUROMUSCULAR REEDUCATION: CPT | Mod: GP | Performed by: PHYSICAL THERAPIST

## 2019-10-04 ENCOUNTER — HEALTH MAINTENANCE LETTER (OUTPATIENT)
Age: 64
End: 2019-10-04

## 2019-11-02 DIAGNOSIS — M25.561 PAIN IN BOTH KNEES, UNSPECIFIED CHRONICITY: ICD-10-CM

## 2019-11-02 DIAGNOSIS — M25.562 PAIN IN BOTH KNEES, UNSPECIFIED CHRONICITY: ICD-10-CM

## 2019-11-02 DIAGNOSIS — N39.0 RECURRENT UTI: ICD-10-CM

## 2019-11-04 RX ORDER — CIPROFLOXACIN 250 MG/1
TABLET, FILM COATED ORAL
Qty: 20 TABLET | Refills: 0 | Status: SHIPPED | OUTPATIENT
Start: 2019-11-04

## 2019-11-04 RX ORDER — CELECOXIB 100 MG/1
CAPSULE ORAL
Qty: 60 CAPSULE | Refills: 0 | Status: SHIPPED | OUTPATIENT
Start: 2019-11-04 | End: 2019-12-02

## 2019-11-04 NOTE — TELEPHONE ENCOUNTER
Routing refill request to provider for review/approval because:  Drug not on the [facility] refill protocol   Labs out of range:  WBC  Maria Antonia Vasques RN

## 2019-11-04 NOTE — TELEPHONE ENCOUNTER
"Requested Prescriptions   Pending Prescriptions Disp Refills     celecoxib (CELEBREX) 100 MG capsule [Pharmacy Med Name: CELECOXIB  Last Written Prescription Date:  7-  Last Fill Quantity: 60 capsule,  # refills: 0   Last office visit: 7/31/2019 with prescribing provider:     Future Office Visit:     100MG CAPSULES] 60 capsule 0     Sig: TAKE 1 TO 2 CAPSULES(100  MG) BY MOUTH DAILY AS NEEDED FOR MODERATE PAIN       NSAID Medications Failed - 11/2/2019  1:14 PM        Failed - Normal CBC on file in past 12 months     Recent Labs   Lab Test 08/02/19  1033   WBC 3.3*   RBC 4.15   HGB 12.6   HCT 37.8                    Passed - Blood pressure under 140/90 in past 12 months     BP Readings from Last 3 Encounters:   09/17/19 92/58   08/02/19 (P) 108/62   07/31/19 110/70                 Passed - Normal ALT on file in past 12 months     Recent Labs   Lab Test 08/02/19  1033   ALT 30             Passed - Normal AST on file in past 12 months     Recent Labs   Lab Test 08/02/19  1033   AST 23             Passed - Recent (12 mo) or future (30 days) visit within the authorizing provider's specialty     Patient has had an office visit with the authorizing provider or a provider within the authorizing providers department within the previous 12 mos or has a future within next 30 days. See \"Patient Info\" tab in inbasket, or \"Choose Columns\" in Meds & Orders section of the refill encounter.              Passed - Patient is age 6-64 years        Passed - Medication is active on med list        Passed - No active pregnancy on record        Passed - Normal serum creatinine on file in past 12 months     Recent Labs   Lab Test 08/02/19  1033   CR 0.73             Passed - No positive pregnancy test in past 12 months        ciprofloxacin (CIPRO) 250 MG tablet [Pharmacy Med Name: CIPROFLOXACIN 250MG TABLETS] 20 tablet 0     Sig: TAKE ONE TABLET BY MOUTH AFTER INTERCOURSE AS DIRECTED       There is no refill protocol " information for this order

## 2019-11-07 PROBLEM — M25.552 BILATERAL HIP PAIN: Status: RESOLVED | Noted: 2019-09-23 | Resolved: 2019-11-07

## 2019-11-07 PROBLEM — M25.551 BILATERAL HIP PAIN: Status: RESOLVED | Noted: 2019-09-23 | Resolved: 2019-11-07

## 2019-11-07 PROBLEM — M54.50 BILATERAL LOW BACK PAIN WITHOUT SCIATICA: Status: RESOLVED | Noted: 2019-09-23 | Resolved: 2019-11-07

## 2019-11-07 NOTE — PROGRESS NOTES
Discharge Note    Progress reporting period is from last progress note on   to Oct 2, 2019.    Tip failed to follow up and current status is unknown.  Please see information below for last relevant information on current status.  Patient seen for 3 visits.    SUBJECTIVE  Subjective changes noted by patient:  Doing well.  .  Current pain level is  .     Previous pain level was  0/10.   Changes in function:  Yes (See Goal flowsheet attached for changes in current functional level)  Adverse reaction to treatment or activity: None    OBJECTIVE  Changes noted in objective findings: Progressed strengthening per flow.     ASSESSMENT/PLAN  Diagnosis: LBP, B hip pain   Updated problem list and treatment plan:   Pain - HEP  STG/LTGs have been met or progress has been made towards goals:  Yes, please see goal flowsheet for most current information  Assessment of Progress: current status is unknown.    Last current status: Pt is progressing as expected   Self Management Plans:  HEP  I have re-evaluated this patient and find that the nature, scope, duration and intensity of the therapy is appropriate for the medical condition of the patient.  Tip continues to require the following intervention to meet STG and LTG's:  HEP.    Recommendations:  Discharge with current home program.  Patient to follow up with MD as needed.    Please refer to the daily flowsheet for treatment today, total treatment time and time spent performing 1:1 timed codes.

## 2019-11-24 DIAGNOSIS — R25.1 TREMOR: ICD-10-CM

## 2019-11-25 RX ORDER — PROPRANOLOL HYDROCHLORIDE 40 MG/1
TABLET ORAL
Qty: 180 TABLET | Refills: 1 | Status: SHIPPED | OUTPATIENT
Start: 2019-11-25 | End: 2020-05-27

## 2019-11-25 NOTE — TELEPHONE ENCOUNTER
"  Last Written Prescription Date:  7/12/19  Last Fill Quantity: 180,  # refills: 0   Last office visit: 7/31/2019 with prescribing provider:     Future Office Visit:    Requested Prescriptions   Pending Prescriptions Disp Refills     propranolol (INDERAL) 40 MG tablet [Pharmacy Med Name: PROPRANOLOL 40MG TABLETS] 180 tablet 0     Sig: TAKE 1 TABLET BY MOUTH TWICE DAILY       Beta-Blockers Protocol Passed - 11/24/2019  5:50 PM        Passed - Blood pressure under 140/90 in past 12 months     BP Readings from Last 3 Encounters:   09/17/19 92/58   08/02/19 (P) 108/62   07/31/19 110/70                 Passed - Patient is age 6 or older        Passed - Recent (12 mo) or future (30 days) visit within the authorizing provider's specialty     Patient has had an office visit with the authorizing provider or a provider within the authorizing providers department within the previous 12 mos or has a future within next 30 days. See \"Patient Info\" tab in inbasket, or \"Choose Columns\" in Meds & Orders section of the refill encounter.              Passed - Medication is active on med list          "

## 2019-12-02 DIAGNOSIS — Z51.81 ENCOUNTER FOR THERAPEUTIC DRUG MONITORING: Primary | ICD-10-CM

## 2019-12-02 DIAGNOSIS — M25.561 PAIN IN BOTH KNEES, UNSPECIFIED CHRONICITY: ICD-10-CM

## 2019-12-02 DIAGNOSIS — M25.562 PAIN IN BOTH KNEES, UNSPECIFIED CHRONICITY: ICD-10-CM

## 2019-12-02 NOTE — TELEPHONE ENCOUNTER
Routing refill request to provider for review/approval because:  Labs not current:  CBC    Kallie Flynn RN, BSN  [facility] [place] [facility]

## 2019-12-02 NOTE — TELEPHONE ENCOUNTER
"Requested Prescriptions   Pending Prescriptions Disp Refills     celecoxib (CELEBREX) 100 MG capsule [Pharmacy Med Name: CELECOXIB 100MG CAPSULES] 60 capsule 0     Sig: TAKE 1 TO 2 CAPSULES(100  MG) BY MOUTH DAILY AS NEEDED FOR MODERATE PAIN  Last Written Prescription Date:  11/4/19  Last Fill Quantity: 60,  # refills: 0   Last office visit: 7/31/2019 with prescribing provider:  yoandy   Future Office Visit:           NSAID Medications Failed - 12/2/2019 12:43 PM        Failed - Normal CBC on file in past 12 months     Recent Labs   Lab Test 08/02/19  1033   WBC 3.3*   RBC 4.15   HGB 12.6   HCT 37.8                    Passed - Blood pressure under 140/90 in past 12 months     BP Readings from Last 3 Encounters:   09/17/19 92/58   08/02/19 (P) 108/62   07/31/19 110/70                 Passed - Normal ALT on file in past 12 months     Recent Labs   Lab Test 08/02/19  1033   ALT 30             Passed - Normal AST on file in past 12 months     Recent Labs   Lab Test 08/02/19  1033   AST 23             Passed - Recent (12 mo) or future (30 days) visit within the authorizing provider's specialty     Patient has had an office visit with the authorizing provider or a provider within the authorizing providers department within the previous 12 mos or has a future within next 30 days. See \"Patient Info\" tab in inbasket, or \"Choose Columns\" in Meds & Orders section of the refill encounter.              Passed - Patient is age 6-64 years        Passed - Medication is active on med list        Passed - No active pregnancy on record        Passed - Normal serum creatinine on file in past 12 months     Recent Labs   Lab Test 08/02/19  1033   CR 0.73             Passed - No positive pregnancy test in past 12 months          "

## 2019-12-03 RX ORDER — CELECOXIB 100 MG/1
CAPSULE ORAL
Qty: 60 CAPSULE | Refills: 1 | Status: SHIPPED | OUTPATIENT
Start: 2019-12-03 | End: 2020-05-18

## 2019-12-04 NOTE — TELEPHONE ENCOUNTER
Routing to team to inform and assist in scheduling.   Gemini Bañuelos RN   [facility] - Triage

## 2019-12-05 NOTE — TELEPHONE ENCOUNTER
Called and left a  advising patient she can schedule an appointment for a lab only.      .Babita YANG    [facility] Joann McDonough

## 2019-12-09 DIAGNOSIS — M25.561 PAIN IN BOTH KNEES, UNSPECIFIED CHRONICITY: ICD-10-CM

## 2019-12-09 DIAGNOSIS — M25.562 PAIN IN BOTH KNEES, UNSPECIFIED CHRONICITY: ICD-10-CM

## 2019-12-09 DIAGNOSIS — Z51.81 ENCOUNTER FOR THERAPEUTIC DRUG MONITORING: ICD-10-CM

## 2019-12-09 LAB
ERYTHROCYTE [DISTWIDTH] IN BLOOD BY AUTOMATED COUNT: 12.9 % (ref 10–15)
HCT VFR BLD AUTO: 37.5 % (ref 35–47)
HGB BLD-MCNC: 12.1 G/DL (ref 11.7–15.7)
MCH RBC QN AUTO: 28.9 PG (ref 26.5–33)
MCHC RBC AUTO-ENTMCNC: 32.3 G/DL (ref 31.5–36.5)
MCV RBC AUTO: 90 FL (ref 78–100)
PLATELET # BLD AUTO: 309 10E9/L (ref 150–450)
RBC # BLD AUTO: 4.19 10E12/L (ref 3.8–5.2)
WBC # BLD AUTO: 4.4 10E9/L (ref 4–11)

## 2019-12-09 PROCEDURE — 36415 COLL VENOUS BLD VENIPUNCTURE: CPT | Performed by: FAMILY MEDICINE

## 2019-12-09 PROCEDURE — 85027 COMPLETE CBC AUTOMATED: CPT | Performed by: FAMILY MEDICINE

## 2019-12-10 NOTE — TELEPHONE ENCOUNTER
Patient seen in lab 12/09.    .Babita YANG    [facility]

## 2020-05-16 DIAGNOSIS — M25.561 PAIN IN BOTH KNEES, UNSPECIFIED CHRONICITY: ICD-10-CM

## 2020-05-16 DIAGNOSIS — M25.562 PAIN IN BOTH KNEES, UNSPECIFIED CHRONICITY: ICD-10-CM

## 2020-05-18 RX ORDER — CELECOXIB 100 MG/1
CAPSULE ORAL
Qty: 60 CAPSULE | Refills: 1 | Status: SHIPPED | OUTPATIENT
Start: 2020-05-18 | End: 2020-07-22

## 2020-05-27 DIAGNOSIS — R25.1 TREMOR: ICD-10-CM

## 2020-05-27 RX ORDER — PROPRANOLOL HYDROCHLORIDE 40 MG/1
TABLET ORAL
Qty: 180 TABLET | Refills: 0 | Status: SHIPPED | OUTPATIENT
Start: 2020-05-27 | End: 2020-08-21

## 2020-05-27 NOTE — TELEPHONE ENCOUNTER
Prescription approved per [facility] Refill Protocol.    Kallie Flynn RN, BSN  [facility] [place] [facility]

## 2020-08-21 DIAGNOSIS — R25.1 TREMOR: ICD-10-CM

## 2020-08-21 DIAGNOSIS — E78.5 HYPERLIPIDEMIA LDL GOAL <160: ICD-10-CM

## 2020-08-21 RX ORDER — SIMVASTATIN 10 MG
TABLET ORAL
Qty: 30 TABLET | Refills: 0 | Status: SHIPPED | OUTPATIENT
Start: 2020-08-21 | End: 2020-11-09

## 2020-08-21 RX ORDER — PROPRANOLOL HYDROCHLORIDE 40 MG/1
TABLET ORAL
Qty: 60 TABLET | Refills: 0 | Status: SHIPPED | OUTPATIENT
Start: 2020-08-21 | End: 2020-10-08

## 2020-08-21 NOTE — TELEPHONE ENCOUNTER
Medication is being filled for 1 time refill only due to:  Patient needs to be seen because it has been more than one year since last visit.     Kayla NEELY RN  EP Triage

## 2020-08-21 NOTE — TELEPHONE ENCOUNTER
Pt has been going back to [facility] in [place]. Can disregard this refill .Jeff ROSA, CMA

## 2020-10-05 DIAGNOSIS — R25.1 TREMOR: ICD-10-CM

## 2020-10-06 NOTE — TELEPHONE ENCOUNTER
Routing refill request to provider for review/approval because:  Elana given x1 and patient did not follow up, please advise  Patient needs to be seen because it has been more than 1 year since last office visit.    DAVIS EdenN, RN  [employer] [facility]

## 2020-10-08 RX ORDER — PROPRANOLOL HYDROCHLORIDE 40 MG/1
TABLET ORAL
Qty: 14 TABLET | Refills: 0 | Status: SHIPPED | OUTPATIENT
Start: 2020-10-08 | End: 2020-11-23

## 2020-10-08 NOTE — TELEPHONE ENCOUNTER
Ok for one last small refill. Remind pt to do follow up for med check and labs, since she is due. Virtual/ Video visit ok

## 2020-10-12 NOTE — TELEPHONE ENCOUNTER
Called patient and she states she is seeing a different provider now that her insurance has changed. She states she has informed the pharmacy about this many times. Shirley Smith, CMA

## 2020-11-05 DIAGNOSIS — E78.5 HYPERLIPIDEMIA LDL GOAL <160: ICD-10-CM

## 2020-11-05 NOTE — LETTER
November 24, 2020      Tip Deng  [address] [place] [address]   [place] [address]        Dear Tip,    I care about your health and have reviewed your health plan. I have reviewed your medical conditions, medication list, and lab results and am making recommendations based on this review, to better manage your health.    You are in particular need of attention regarding:  Medication Check Up    I am recommending that you:  -schedule a FOLLOWUP OFFICE APPOINTMENT with me.       Here is a list of Health Maintenance topics that are due now or due soon:  Health Maintenance Due   Topic Date Due     Osteoporosis Screening  1955     PHQ-2  01/01/2020     Annual Wellness Visit  04/25/2020     FALL RISK ASSESSMENT  04/25/2020     Pneumococcal Vaccine (1 of 1 - PPSV23) 04/25/2020     Colorectal Cancer Screening  06/11/2020     Comprehensive Metabolic Panel  08/02/2020     Flu Vaccine (1) 09/01/2020       Please call us at [phone] (or use [employer]) to address the above recommendations.     Thank you for trusting [facility] and we appreciate the opportunity to serve you.  We look forward to supporting your healthcare needs in the future.    Healthy Regards,    Kaylene Mccartney MD

## 2020-11-06 NOTE — TELEPHONE ENCOUNTER
Routing refill request to provider for review/approval because:  Labs not current:  LDL last done on 8/2/19    Kayla NEELY RN  EP Triage

## 2020-11-08 ENCOUNTER — HEALTH MAINTENANCE LETTER (OUTPATIENT)
Age: 65
End: 2020-11-08

## 2020-11-09 RX ORDER — SIMVASTATIN 10 MG
TABLET ORAL
Qty: 30 TABLET | Refills: 0 | Status: SHIPPED | OUTPATIENT
Start: 2020-11-09

## 2020-11-10 NOTE — TELEPHONE ENCOUNTER
Left message for patient to call back to schedule virtual/video or office visit with Dr. Mccartney. Oni NORRIS [facility]

## 2020-11-10 NOTE — TELEPHONE ENCOUNTER
Script refill faxed. Remind pt to do follow up for med check and labs, since she is due. Virtual/ Video visit ok

## 2020-11-19 DIAGNOSIS — R25.1 TREMOR: ICD-10-CM

## 2020-11-20 NOTE — TELEPHONE ENCOUNTER
Routing refill request to provider for review/approval because:  Elana given x1 and patient did not follow up, please advise  Patient needs to be seen because it has been more than 1 year since last office visit.  BP low   BP Readings from Last 3 Encounters:   09/17/19 92/58   08/02/19 (P) 108/62   07/31/19 110/70     Maria Antonia Vasques RN

## 2020-11-23 RX ORDER — PROPRANOLOL HYDROCHLORIDE 40 MG/1
40 TABLET ORAL 2 TIMES DAILY
Qty: 14 TABLET | Refills: 0 | Status: SHIPPED | OUTPATIENT
Start: 2020-11-23

## 2020-11-23 NOTE — TELEPHONE ENCOUNTER
Ok for one small  refill faxed. Remind pt to do follow up for med check and labs, since she is due. Virtual/ Video visit ok

## 2020-12-01 NOTE — TELEPHONE ENCOUNTER
Patient has not looked at [place] message, vm left for patient to call back.    .Babita YANG    [facility] [place]

## 2021-07-17 ENCOUNTER — HEALTH MAINTENANCE LETTER (OUTPATIENT)
Age: 66
End: 2021-07-17

## 2021-09-11 ENCOUNTER — HEALTH MAINTENANCE LETTER (OUTPATIENT)
Age: 66
End: 2021-09-11

## 2022-01-01 ENCOUNTER — HEALTH MAINTENANCE LETTER (OUTPATIENT)
Age: 67
End: 2022-01-01

## 2022-10-30 ENCOUNTER — HEALTH MAINTENANCE LETTER (OUTPATIENT)
Age: 67
End: 2022-10-30

## 2023-04-08 ENCOUNTER — HEALTH MAINTENANCE LETTER (OUTPATIENT)
Age: 68
End: 2023-04-08

## 2023-09-03 ENCOUNTER — HEALTH MAINTENANCE LETTER (OUTPATIENT)
Age: 68
End: 2023-09-03